# Patient Record
Sex: FEMALE | Race: WHITE | Employment: FULL TIME | URBAN - METROPOLITAN AREA
[De-identification: names, ages, dates, MRNs, and addresses within clinical notes are randomized per-mention and may not be internally consistent; named-entity substitution may affect disease eponyms.]

---

## 2018-09-19 ENCOUNTER — OFFICE VISIT (OUTPATIENT)
Dept: FAMILY MEDICINE CLINIC | Age: 23
End: 2018-09-19

## 2018-09-19 VITALS
HEIGHT: 69 IN | DIASTOLIC BLOOD PRESSURE: 72 MMHG | SYSTOLIC BLOOD PRESSURE: 110 MMHG | HEART RATE: 86 BPM | BODY MASS INDEX: 27.99 KG/M2 | WEIGHT: 189 LBS | OXYGEN SATURATION: 96 %

## 2018-09-19 DIAGNOSIS — N92.6 IRREGULAR MENSES: ICD-10-CM

## 2018-09-19 DIAGNOSIS — Z11.4 ENCOUNTER FOR SCREENING FOR HIV: ICD-10-CM

## 2018-09-19 DIAGNOSIS — Z72.51 UNPROTECTED SEX: ICD-10-CM

## 2018-09-19 DIAGNOSIS — Z00.00 ENCOUNTER FOR MEDICAL EXAMINATION TO ESTABLISH CARE: Primary | ICD-10-CM

## 2018-09-19 LAB
A/G RATIO: 1.6 (ref 1.1–2.2)
ALBUMIN SERPL-MCNC: 4.6 G/DL (ref 3.4–5)
ALP BLD-CCNC: 59 U/L (ref 40–129)
ALT SERPL-CCNC: 9 U/L (ref 10–40)
ANION GAP SERPL CALCULATED.3IONS-SCNC: 13 MMOL/L (ref 3–16)
AST SERPL-CCNC: 13 U/L (ref 15–37)
BASOPHILS ABSOLUTE: 0.1 K/UL (ref 0–0.2)
BASOPHILS RELATIVE PERCENT: 0.5 %
BILIRUB SERPL-MCNC: 0.4 MG/DL (ref 0–1)
BUN BLDV-MCNC: 10 MG/DL (ref 7–20)
CALCIUM SERPL-MCNC: 9 MG/DL (ref 8.3–10.6)
CHLORIDE BLD-SCNC: 104 MMOL/L (ref 99–110)
CO2: 24 MMOL/L (ref 21–32)
CONTROL: NORMAL
CREAT SERPL-MCNC: 0.7 MG/DL (ref 0.6–1.1)
EOSINOPHILS ABSOLUTE: 0.1 K/UL (ref 0–0.6)
EOSINOPHILS RELATIVE PERCENT: 0.9 %
GFR AFRICAN AMERICAN: >60
GFR NON-AFRICAN AMERICAN: >60
GLOBULIN: 2.8 G/DL
GLUCOSE BLD-MCNC: 78 MG/DL (ref 70–99)
HCG(URINE) PREGNANCY TEST: NEGATIVE
HCT VFR BLD CALC: 41 % (ref 36–48)
HEMOGLOBIN: 13.4 G/DL (ref 12–16)
HEPATITIS B SURFACE ANTIGEN INTERPRETATION: NORMAL
HEPATITIS C ANTIBODY INTERPRETATION: NORMAL
LYMPHOCYTES ABSOLUTE: 2.7 K/UL (ref 1–5.1)
LYMPHOCYTES RELATIVE PERCENT: 24.4 %
MCH RBC QN AUTO: 28.1 PG (ref 26–34)
MCHC RBC AUTO-ENTMCNC: 32.8 G/DL (ref 31–36)
MCV RBC AUTO: 85.7 FL (ref 80–100)
MONOCYTES ABSOLUTE: 0.7 K/UL (ref 0–1.3)
MONOCYTES RELATIVE PERCENT: 6 %
NEUTROPHILS ABSOLUTE: 7.7 K/UL (ref 1.7–7.7)
NEUTROPHILS RELATIVE PERCENT: 68.2 %
PDW BLD-RTO: 14.2 % (ref 12.4–15.4)
PLATELET # BLD: 369 K/UL (ref 135–450)
PMV BLD AUTO: 7 FL (ref 5–10.5)
POTASSIUM SERPL-SCNC: 4.5 MMOL/L (ref 3.5–5.1)
PREGNANCY TEST URINE, POC: NORMAL
RBC # BLD: 4.78 M/UL (ref 4–5.2)
SODIUM BLD-SCNC: 141 MMOL/L (ref 136–145)
TOTAL PROTEIN: 7.4 G/DL (ref 6.4–8.2)
WBC # BLD: 11.2 K/UL (ref 4–11)

## 2018-09-19 PROCEDURE — 36415 COLL VENOUS BLD VENIPUNCTURE: CPT | Performed by: PHYSICIAN ASSISTANT

## 2018-09-19 PROCEDURE — 99385 PREV VISIT NEW AGE 18-39: CPT | Performed by: PHYSICIAN ASSISTANT

## 2018-09-19 PROCEDURE — 81025 URINE PREGNANCY TEST: CPT | Performed by: PHYSICIAN ASSISTANT

## 2018-09-19 ASSESSMENT — ENCOUNTER SYMPTOMS
NAUSEA: 0
ABDOMINAL PAIN: 0
SORE THROAT: 0
SHORTNESS OF BREATH: 0
COUGH: 0
VOMITING: 0
RHINORRHEA: 0
CONSTIPATION: 0
DIARRHEA: 0

## 2018-09-19 ASSESSMENT — PATIENT HEALTH QUESTIONNAIRE - PHQ9
SUM OF ALL RESPONSES TO PHQ QUESTIONS 1-9: 0
SUM OF ALL RESPONSES TO PHQ QUESTIONS 1-9: 0
SUM OF ALL RESPONSES TO PHQ9 QUESTIONS 1 & 2: 0
1. LITTLE INTEREST OR PLEASURE IN DOING THINGS: 0
DEPRESSION UNABLE TO ASSESS: PT REFUSES
2. FEELING DOWN, DEPRESSED OR HOPELESS: 0

## 2018-09-20 LAB
HIV AG/AB: NORMAL
HIV ANTIGEN: NORMAL
HIV-1 ANTIBODY: NORMAL
HIV-2 AB: NORMAL

## 2018-09-24 LAB
C. TRACHOMATIS DNA ,URINE: NEGATIVE
N. GONORRHOEAE DNA, URINE: NEGATIVE

## 2018-11-26 ENCOUNTER — OFFICE VISIT (OUTPATIENT)
Dept: FAMILY MEDICINE CLINIC | Age: 23
End: 2018-11-26
Payer: COMMERCIAL

## 2018-11-26 VITALS
HEART RATE: 90 BPM | DIASTOLIC BLOOD PRESSURE: 70 MMHG | WEIGHT: 185.2 LBS | SYSTOLIC BLOOD PRESSURE: 124 MMHG | HEIGHT: 69 IN | BODY MASS INDEX: 27.43 KG/M2 | OXYGEN SATURATION: 98 %

## 2018-11-26 DIAGNOSIS — D72.829 LEUKOCYTOSIS, UNSPECIFIED TYPE: ICD-10-CM

## 2018-11-26 DIAGNOSIS — Z01.419 ENCOUNTER FOR GYNECOLOGICAL EXAMINATION WITHOUT ABNORMAL FINDING: Primary | ICD-10-CM

## 2018-11-26 DIAGNOSIS — N92.6 IRREGULAR MENSES: ICD-10-CM

## 2018-11-26 LAB
HCT VFR BLD CALC: 42.8 % (ref 36–48)
HEMOGLOBIN: 13.9 G/DL (ref 12–16)
MCH RBC QN AUTO: 27.5 PG (ref 26–34)
MCHC RBC AUTO-ENTMCNC: 32.5 G/DL (ref 31–36)
MCV RBC AUTO: 84.5 FL (ref 80–100)
PDW BLD-RTO: 15.2 % (ref 12.4–15.4)
PLATELET # BLD: 394 K/UL (ref 135–450)
PMV BLD AUTO: 7.2 FL (ref 5–10.5)
RBC # BLD: 5.06 M/UL (ref 4–5.2)
WBC # BLD: 6.5 K/UL (ref 4–11)

## 2018-11-26 PROCEDURE — S0612 ANNUAL GYNECOLOGICAL EXAMINA: HCPCS | Performed by: PHYSICIAN ASSISTANT

## 2018-11-26 ASSESSMENT — ENCOUNTER SYMPTOMS
VOMITING: 0
NAUSEA: 0
ABDOMINAL PAIN: 0
DIARRHEA: 0
RHINORRHEA: 0
SHORTNESS OF BREATH: 0
SORE THROAT: 0
COUGH: 0
CONSTIPATION: 0

## 2018-11-26 NOTE — PROGRESS NOTES
2018    Melania Wright (:  1995) is a 26 y.o. female, here for a preventive medicine evaluation. Patient Active Problem List   Diagnosis    History of intentional self-harm    Irregular menses     History of irregular menses. This is not a new development. Periods last 5 days, normal flow. Sometimes will go one month without, but usually menstruates monthly. Does not perform self breast exams. Date of last menstruation -. Mother with h/o cervical cancer. Review of Systems   Constitutional: Negative for activity change, chills and fever. HENT: Negative for congestion, ear pain, rhinorrhea and sore throat. Eyes: Negative for visual disturbance. Respiratory: Negative for cough and shortness of breath. Cardiovascular: Negative for chest pain and palpitations. Gastrointestinal: Negative for abdominal pain, constipation, diarrhea, nausea and vomiting. Genitourinary: Positive for menstrual problem (irregular menses). Negative for difficulty urinating and dysuria. Musculoskeletal: Negative for arthralgias and myalgias. Skin: Negative for rash. Neurological: Negative for dizziness, weakness and numbness. Psychiatric/Behavioral: Negative for sleep disturbance.        Prior to Visit Medications    Not on File        No Known Allergies    Past Medical History:   Diagnosis Date    Right foot injury        Past Surgical History:   Procedure Laterality Date    MYRINGOTOMY AND TYMPANOSTOMY TUBE PLACEMENT         Social History     Social History    Marital status: Single     Spouse name: N/A    Number of children: 0    Years of education: N/A     Occupational History    [employer]      Social History Main Topics    Smoking status: Never Smoker    Smokeless tobacco: Never Used    Alcohol use Yes      Comment: occasional    Drug use: No    Sexual activity: Yes     Partners: Male     Other Topics Concern    Not on file     Social History Narrative    No

## 2018-11-27 LAB
HPV COMMENT: ABNORMAL
HPV TYPE 16: NOT DETECTED
HPV TYPE 18: NOT DETECTED
HPVOH (OTHER TYPES): DETECTED

## 2018-11-28 ENCOUNTER — TELEPHONE (OUTPATIENT)
Dept: FAMILY MEDICINE CLINIC | Age: 23
End: 2018-11-28

## 2018-11-28 DIAGNOSIS — R87.810 CERVICAL HIGH RISK HPV (HUMAN PAPILLOMAVIRUS) TEST POSITIVE: ICD-10-CM

## 2019-10-07 ENCOUNTER — OFFICE VISIT (OUTPATIENT)
Dept: FAMILY MEDICINE CLINIC | Age: 24
End: 2019-10-07
Payer: COMMERCIAL

## 2019-10-07 VITALS
BODY MASS INDEX: 28.58 KG/M2 | TEMPERATURE: 98.4 F | WEIGHT: 193 LBS | SYSTOLIC BLOOD PRESSURE: 106 MMHG | OXYGEN SATURATION: 99 % | HEIGHT: 69 IN | DIASTOLIC BLOOD PRESSURE: 80 MMHG | HEART RATE: 92 BPM | RESPIRATION RATE: 16 BRPM

## 2019-10-07 DIAGNOSIS — N89.8 VAGINAL DISCHARGE: Primary | ICD-10-CM

## 2019-10-07 DIAGNOSIS — Z32.01 POSITIVE PREGNANCY TEST: ICD-10-CM

## 2019-10-07 DIAGNOSIS — Z20.2 STD EXPOSURE: ICD-10-CM

## 2019-10-07 DIAGNOSIS — Z01.419 WOMEN'S ANNUAL ROUTINE GYNECOLOGICAL EXAMINATION: ICD-10-CM

## 2019-10-07 LAB
CONTROL: NORMAL
PREGNANCY TEST URINE, POC: NORMAL

## 2019-10-07 PROCEDURE — 99214 OFFICE O/P EST MOD 30 MIN: CPT | Performed by: PHYSICIAN ASSISTANT

## 2019-10-07 PROCEDURE — 81025 URINE PREGNANCY TEST: CPT | Performed by: PHYSICIAN ASSISTANT

## 2019-10-07 ASSESSMENT — PATIENT HEALTH QUESTIONNAIRE - PHQ9
2. FEELING DOWN, DEPRESSED OR HOPELESS: 0
SUM OF ALL RESPONSES TO PHQ9 QUESTIONS 1 & 2: 0
SUM OF ALL RESPONSES TO PHQ QUESTIONS 1-9: 0
1. LITTLE INTEREST OR PLEASURE IN DOING THINGS: 0
SUM OF ALL RESPONSES TO PHQ QUESTIONS 1-9: 0

## 2019-10-08 LAB
CANDIDA SPECIES, DNA PROBE: ABNORMAL
GARDNERELLA VAGINALIS, DNA PROBE: ABNORMAL
TRICHOMONAS VAGINALIS DNA: ABNORMAL

## 2019-10-09 ENCOUNTER — TELEPHONE (OUTPATIENT)
Dept: FAMILY MEDICINE CLINIC | Age: 24
End: 2019-10-09

## 2019-10-09 DIAGNOSIS — N76.0 BACTERIAL VAGINOSIS: Primary | ICD-10-CM

## 2019-10-09 DIAGNOSIS — B96.89 BACTERIAL VAGINOSIS: Primary | ICD-10-CM

## 2019-10-09 LAB
C TRACH DNA GENITAL QL NAA+PROBE: NEGATIVE
N. GONORRHOEAE DNA: NEGATIVE

## 2019-10-09 RX ORDER — METRONIDAZOLE 500 MG/1
500 TABLET ORAL 2 TIMES DAILY
Qty: 14 TABLET | Refills: 0 | Status: SHIPPED | OUTPATIENT
Start: 2019-10-09 | End: 2019-10-16

## 2019-10-28 ENCOUNTER — OFFICE VISIT (OUTPATIENT)
Dept: OBGYN CLINIC | Age: 24
End: 2019-10-28
Payer: COMMERCIAL

## 2019-10-28 VITALS
DIASTOLIC BLOOD PRESSURE: 70 MMHG | SYSTOLIC BLOOD PRESSURE: 106 MMHG | BODY MASS INDEX: 29.6 KG/M2 | TEMPERATURE: 98.7 F | WEIGHT: 188.6 LBS | HEIGHT: 67 IN | HEART RATE: 90 BPM

## 2019-10-28 DIAGNOSIS — N91.2 AMENORRHEA: Primary | ICD-10-CM

## 2019-10-28 DIAGNOSIS — O21.9 NAUSEA AND VOMITING IN PREGNANCY: ICD-10-CM

## 2019-10-28 DIAGNOSIS — R87.810 CERVICAL HIGH RISK HPV (HUMAN PAPILLOMAVIRUS) TEST POSITIVE: ICD-10-CM

## 2019-10-28 LAB
BACTERIA: ABNORMAL /HPF
BILIRUBIN URINE: NEGATIVE
BLOOD, URINE: NEGATIVE
CLARITY: ABNORMAL
COLOR: YELLOW
CONTROL: NORMAL
CRL: NORMAL CM
EPITHELIAL CELLS, UA: 4 /HPF (ref 0–5)
GLUCOSE URINE: NEGATIVE MG/DL
HYALINE CASTS: 3 /LPF (ref 0–8)
KETONES, URINE: NEGATIVE MG/DL
LEUKOCYTE ESTERASE, URINE: NEGATIVE
MICROSCOPIC EXAMINATION: YES
NITRITE, URINE: NEGATIVE
PH UA: 6.5 (ref 5–8)
PREGNANCY TEST URINE, POC: POSITIVE
PROTEIN UA: NEGATIVE MG/DL
RBC UA: 1 /HPF (ref 0–4)
SAC DIAMETER: NORMAL CM
SPECIFIC GRAVITY UA: 1.01 (ref 1–1.03)
URINE TYPE: ABNORMAL
UROBILINOGEN, URINE: 1 E.U./DL
WBC UA: 2 /HPF (ref 0–5)

## 2019-10-28 PROCEDURE — 99203 OFFICE O/P NEW LOW 30 MIN: CPT | Performed by: OBSTETRICS & GYNECOLOGY

## 2019-10-28 PROCEDURE — 81025 URINE PREGNANCY TEST: CPT | Performed by: OBSTETRICS & GYNECOLOGY

## 2019-10-28 PROCEDURE — 76801 OB US < 14 WKS SINGLE FETUS: CPT | Performed by: OBSTETRICS & GYNECOLOGY

## 2019-10-28 RX ORDER — PYRIDOXINE HCL (VITAMIN B6) 25 MG
25 TABLET ORAL NIGHTLY
Qty: 30 TABLET | Refills: 1 | Status: SHIPPED | OUTPATIENT
Start: 2019-10-28 | End: 2020-02-17

## 2019-10-28 ASSESSMENT — ENCOUNTER SYMPTOMS
ABDOMINAL PAIN: 0
VOMITING: 0
BACK PAIN: 0
DIARRHEA: 0
NAUSEA: 0
SHORTNESS OF BREATH: 0
CONSTIPATION: 0

## 2019-10-30 LAB — URINE CULTURE, ROUTINE: NORMAL

## 2019-11-25 ENCOUNTER — INITIAL PRENATAL (OUTPATIENT)
Dept: OBGYN CLINIC | Age: 24
End: 2019-11-25
Payer: COMMERCIAL

## 2019-11-25 VITALS
WEIGHT: 187.6 LBS | HEART RATE: 79 BPM | BODY MASS INDEX: 29.38 KG/M2 | DIASTOLIC BLOOD PRESSURE: 58 MMHG | SYSTOLIC BLOOD PRESSURE: 86 MMHG

## 2019-11-25 DIAGNOSIS — R87.810 CERVICAL HIGH RISK HPV (HUMAN PAPILLOMAVIRUS) TEST POSITIVE: ICD-10-CM

## 2019-11-25 DIAGNOSIS — Z34.92 PRENATAL CARE IN SECOND TRIMESTER: Primary | ICD-10-CM

## 2019-11-25 LAB
ABO/RH: NORMAL
ANTIBODY SCREEN: NORMAL
BASOPHILS ABSOLUTE: 0 K/UL (ref 0–0.2)
BASOPHILS RELATIVE PERCENT: 0.2 %
EOSINOPHILS ABSOLUTE: 0.1 K/UL (ref 0–0.6)
EOSINOPHILS RELATIVE PERCENT: 0.7 %
HCT VFR BLD CALC: 40.2 % (ref 36–48)
HEMOGLOBIN: 13.5 G/DL (ref 12–16)
LYMPHOCYTES ABSOLUTE: 1.9 K/UL (ref 1–5.1)
LYMPHOCYTES RELATIVE PERCENT: 20.5 %
MCH RBC QN AUTO: 28.2 PG (ref 26–34)
MCHC RBC AUTO-ENTMCNC: 33.7 G/DL (ref 31–36)
MCV RBC AUTO: 83.7 FL (ref 80–100)
MONOCYTES ABSOLUTE: 0.5 K/UL (ref 0–1.3)
MONOCYTES RELATIVE PERCENT: 5.4 %
NEUTROPHILS ABSOLUTE: 6.7 K/UL (ref 1.7–7.7)
NEUTROPHILS RELATIVE PERCENT: 73.2 %
PDW BLD-RTO: 15.4 % (ref 12.4–15.4)
PLATELET # BLD: 323 K/UL (ref 135–450)
PMV BLD AUTO: 7.4 FL (ref 5–10.5)
RBC # BLD: 4.8 M/UL (ref 4–5.2)
WBC # BLD: 9.1 K/UL (ref 4–11)

## 2019-11-25 PROCEDURE — 90471 IMMUNIZATION ADMIN: CPT | Performed by: OBSTETRICS & GYNECOLOGY

## 2019-11-25 PROCEDURE — 36415 COLL VENOUS BLD VENIPUNCTURE: CPT | Performed by: OBSTETRICS & GYNECOLOGY

## 2019-11-25 PROCEDURE — 0500F INITIAL PRENATAL CARE VISIT: CPT | Performed by: OBSTETRICS & GYNECOLOGY

## 2019-11-25 PROCEDURE — 90686 IIV4 VACC NO PRSV 0.5 ML IM: CPT | Performed by: OBSTETRICS & GYNECOLOGY

## 2019-11-26 LAB
AMPHETAMINE SCREEN, URINE: NORMAL
BARBITURATE SCREEN URINE: NORMAL
BENZODIAZEPINE SCREEN, URINE: NORMAL
CANNABINOID SCREEN URINE: NORMAL
COCAINE METABOLITE SCREEN URINE: NORMAL
HEPATITIS B SURFACE ANTIGEN INTERPRETATION: NORMAL
HIV AG/AB: NORMAL
HIV ANTIGEN: NORMAL
HIV-1 ANTIBODY: NORMAL
HIV-2 AB: NORMAL
Lab: NORMAL
METHADONE SCREEN, URINE: NORMAL
OPIATE SCREEN URINE: NORMAL
OXYCODONE URINE: NORMAL
PH UA: 5
PHENCYCLIDINE SCREEN URINE: NORMAL
PROPOXYPHENE SCREEN: NORMAL
RUBELLA ANTIBODY IGG: 332.6 IU/ML
TOTAL SYPHILLIS IGG/IGM: NORMAL

## 2019-12-23 ENCOUNTER — OFFICE VISIT (OUTPATIENT)
Dept: OBGYN CLINIC | Age: 24
End: 2019-12-23
Payer: COMMERCIAL

## 2019-12-23 ENCOUNTER — ROUTINE PRENATAL (OUTPATIENT)
Dept: OBGYN CLINIC | Age: 24
End: 2019-12-23

## 2019-12-23 VITALS
WEIGHT: 186.6 LBS | HEART RATE: 93 BPM | BODY MASS INDEX: 29.23 KG/M2 | SYSTOLIC BLOOD PRESSURE: 104 MMHG | DIASTOLIC BLOOD PRESSURE: 64 MMHG

## 2019-12-23 DIAGNOSIS — R87.810 CERVICAL HIGH RISK HPV (HUMAN PAPILLOMAVIRUS) TEST POSITIVE: ICD-10-CM

## 2019-12-23 DIAGNOSIS — Z34.92 PRENATAL CARE IN SECOND TRIMESTER: Primary | ICD-10-CM

## 2019-12-23 DIAGNOSIS — O21.9 NAUSEA AND VOMITING IN PREGNANCY: ICD-10-CM

## 2019-12-23 LAB
ABDOMINAL CIRCUMFERENCE: NORMAL
BIPARIETAL DIAMETER: NORMAL
ESTIMATED FETAL WEIGHT: NORMAL
FEMORAL DIAMETER: NORMAL
HC/AC: NORMAL
HEAD CIRCUMFERENCE: NORMAL

## 2019-12-23 PROCEDURE — 0502F SUBSEQUENT PRENATAL CARE: CPT | Performed by: OBSTETRICS & GYNECOLOGY

## 2019-12-23 PROCEDURE — 76805 OB US >/= 14 WKS SNGL FETUS: CPT | Performed by: OBSTETRICS & GYNECOLOGY

## 2020-01-23 ENCOUNTER — OFFICE VISIT (OUTPATIENT)
Dept: OBGYN CLINIC | Age: 25
End: 2020-01-23
Payer: COMMERCIAL

## 2020-01-23 ENCOUNTER — ROUTINE PRENATAL (OUTPATIENT)
Dept: OBGYN CLINIC | Age: 25
End: 2020-01-23

## 2020-01-23 VITALS
WEIGHT: 190 LBS | DIASTOLIC BLOOD PRESSURE: 60 MMHG | HEART RATE: 90 BPM | BODY MASS INDEX: 29.76 KG/M2 | SYSTOLIC BLOOD PRESSURE: 106 MMHG

## 2020-01-23 PROCEDURE — 76816 OB US FOLLOW-UP PER FETUS: CPT | Performed by: OBSTETRICS & GYNECOLOGY

## 2020-01-23 PROCEDURE — 0502F SUBSEQUENT PRENATAL CARE: CPT | Performed by: OBSTETRICS & GYNECOLOGY

## 2020-01-23 NOTE — PROGRESS NOTES
Return OB Office Visit    CC:   Chief Complaint   Patient presents with    Routine Prenatal Visit       HPI:  Patient seen and examined. Patient is doing well today. Patient reports some right sided pelvic pain that radiates to her groin. States it is worse with movement and when she has been on her feet for long periods of time. Denies VB, LOF, ctx. +FM. Denies headaches, vision changes, RUQ pain, increased LE edema. Denies chest pain, shortness of breath, fever, chills, nausea, vomiting. Objective:  /60   Pulse 90   Wt 190 lb (86.2 kg)   LMP 08/06/2019 (Exact Date)   BMI 29.76 kg/m²     Physical Exam  Vitals signs reviewed. Constitutional:       General: She is not in acute distress. Appearance: She is well-developed. HENT:      Head: Normocephalic and atraumatic. Eyes:      Conjunctiva/sclera: Conjunctivae normal.   Cardiovascular:      Rate and Rhythm: Normal rate. Pulmonary:      Effort: Pulmonary effort is normal. No respiratory distress. Abdominal:      General: There is no distension. Palpations: Abdomen is soft. Tenderness: There is no tenderness. There is no guarding or rebound. Musculoskeletal:         General: No swelling. Skin:     General: Skin is warm and dry. Neurological:      Mental Status: She is alert and oriented to person, place, and time. Psychiatric:         Behavior: Behavior normal.         Thought Content: Thought content normal.       Ultrasound:   OBSTETRICAL ULTRASOUND GROWTH    DATE: 1/23/20    PHYSICIAN: MARCIAL Call.     SONOGRAPHER: Jakub Guevara [facility]    INDICATION: Growth, follow up     COMPARISON: 12/23/19    TYPE OF SCAN: abdominal    FINDINGS:  A single viable intrauterine pregnancy is noted in cephalic presentation. Cardiac and somatic activity are noted.     The following values were obtained:   Fetal heart rate    156bpm     BPD      5.78cm  23 weeks 5 day(s)   Head Circumference    21.77cm 23 weeks 6 day(s)    Abdominal Circumference   18.52cm 23 weeks 2 day(s)   Femur Length     4.13cm  23 weeks 3 day(s)   Humerus Length    4.12cm  25 weeks 0 day(s)   Amniotic fluid index    12.31cm     EFW      593g  30.0 percentile    Amniotic fluid volume is normal. Based on sonographic criteria the estimated fetal age is 23weeks and 6days with EDC of 5/15/20. There is a 3 day discordance with the established EDC of 20. The patient has an anterior placenta that is adequate distance in relation to the internal cervical os. The evaluation of the lower uterine segment and cervix reveals normal appearing anatomy. The uterus is unremarkable/gravid. Maternal ovaries and adnexae are not well visualized due to the size of the uterus and patient's gravid state. Anatomy seen includes: RVOT, LVOT, ductal arch, aortic arch, stomach, kidneys, bladder, diaphragm    IMPRESSION:  Single live IUP in the third trimester. Adequate interval fetal growth. Limited 4 chamber heart and cerebellum. Assessment/Plan:   Ale Murray is a 27 y.o.  at 24w2d who presents for routine OB visit    1. Prenatal care in second trimester     - FWB: reassuring by ultrasound today     - Maternal wellness questionnaire reviewed - no concerns today, score 1     - Genetic screening: patient declined     - Carrier screening: patient declined     - Anatomy scan: 19 Single living Female IUP. No gross structural abnormalities are visualized. Ant placenta / no previa. CL 44 mm. Amniotic fluid volume is subjectively normal. Limited views of Diaphragm, 4 chamber, LVOT, RVOT, Ductic Arch, Aortic Arch.       - Repeat Anatomy: Limited 4 chamber view and cerebellum on repeat US at 24 weeks - M referral placed     - Flu shot: given 19     - Tdap: plan at 28wks     - PNL: A+/ab-, RI, HepB neg, HIVnr, RPR, Hgb 13.5, UDS negative, GCCT neg, UCx no growth     - Labor, decreased FM, VB, LOF precautions provided     - Follow-up in 4 weeks for LAURA visit, glucose screen and Tdap    Wallene Check, DO

## 2020-02-17 ENCOUNTER — ROUTINE PRENATAL (OUTPATIENT)
Dept: OBGYN CLINIC | Age: 25
End: 2020-02-17
Payer: COMMERCIAL

## 2020-02-17 VITALS
BODY MASS INDEX: 29.98 KG/M2 | HEART RATE: 92 BPM | SYSTOLIC BLOOD PRESSURE: 110 MMHG | WEIGHT: 191.4 LBS | DIASTOLIC BLOOD PRESSURE: 68 MMHG

## 2020-02-17 LAB
BASOPHILS ABSOLUTE: 0 K/UL (ref 0–0.2)
BASOPHILS RELATIVE PERCENT: 0.2 %
EOSINOPHILS ABSOLUTE: 0.1 K/UL (ref 0–0.6)
EOSINOPHILS RELATIVE PERCENT: 0.8 %
GLUCOSE CHALLENGE: 112 MG/DL
HCT VFR BLD CALC: 37.9 % (ref 36–48)
HEMOGLOBIN: 12.4 G/DL (ref 12–16)
LYMPHOCYTES ABSOLUTE: 2.1 K/UL (ref 1–5.1)
LYMPHOCYTES RELATIVE PERCENT: 21.7 %
MCH RBC QN AUTO: 29 PG (ref 26–34)
MCHC RBC AUTO-ENTMCNC: 32.8 G/DL (ref 31–36)
MCV RBC AUTO: 88.5 FL (ref 80–100)
MONOCYTES ABSOLUTE: 0.5 K/UL (ref 0–1.3)
MONOCYTES RELATIVE PERCENT: 4.8 %
NEUTROPHILS ABSOLUTE: 7 K/UL (ref 1.7–7.7)
NEUTROPHILS RELATIVE PERCENT: 72.5 %
PDW BLD-RTO: 14.9 % (ref 12.4–15.4)
PLATELET # BLD: 340 K/UL (ref 135–450)
PMV BLD AUTO: 7.7 FL (ref 5–10.5)
RBC # BLD: 4.28 M/UL (ref 4–5.2)
WBC # BLD: 9.7 K/UL (ref 4–11)

## 2020-02-17 PROCEDURE — 0502F SUBSEQUENT PRENATAL CARE: CPT | Performed by: OBSTETRICS & GYNECOLOGY

## 2020-02-17 PROCEDURE — 36415 COLL VENOUS BLD VENIPUNCTURE: CPT | Performed by: OBSTETRICS & GYNECOLOGY

## 2020-02-17 NOTE — PROGRESS NOTES
Blood draw from R Antecubital x 1 attempt without difficulty. 1 PST, 1 LAV tubes drawn. Patient tolerated well.  Ana Lilia Landon
Temp:98.4 f oral    Maternal emotional well being screening form completed and reviewed with patient. Current score is 2. Patient given referral to [address] [place] [address] ([phone]):  No
weeks: Limited 4 chamber view and cerebellum       - MFM Level II US - Normal fetal anatomic survey     - Flu shot: given 19     - Tdap: given 2020     - PNL: A+/ab-, RI, HepB neg, HIVnr, RPR, Hgb 13.5, UDS negative, GCCT neg, UCx no growth     - Labor, decreased FM, VB, LOF precautions provided     - Tdap, glucose screen and CBC today     - Follow-up in 2 weeks for LAURA visit    2.   screening     - Glucose screen and CBC today    Carl Calhoun DO

## 2020-03-02 ENCOUNTER — ROUTINE PRENATAL (OUTPATIENT)
Dept: OBGYN CLINIC | Age: 25
End: 2020-03-02

## 2020-03-02 VITALS
DIASTOLIC BLOOD PRESSURE: 78 MMHG | HEART RATE: 87 BPM | WEIGHT: 189 LBS | SYSTOLIC BLOOD PRESSURE: 110 MMHG | BODY MASS INDEX: 29.6 KG/M2

## 2020-03-02 PROBLEM — O26.13 LOW WEIGHT GAIN IN PREGNANCY IN THIRD TRIMESTER: Status: ACTIVE | Noted: 2020-03-02

## 2020-03-02 PROBLEM — Z34.93 PRENATAL CARE IN THIRD TRIMESTER: Status: ACTIVE | Noted: 2019-11-25

## 2020-03-02 PROCEDURE — 0502F SUBSEQUENT PRENATAL CARE: CPT | Performed by: OBSTETRICS & GYNECOLOGY

## 2020-03-02 NOTE — PROGRESS NOTES
Temp; 98.2   Maternal emotional well being screening form completed and reviewed with patient. Current score is 1. Patient given referral to [address] [place] [address] ([phone]):  No
high risk HPV (human papillomavirus) test positive     - NILM/HPV other positive 10/2019  - plan repeat pap/cotesting in 1 year               Dispo: RTC in 2 weeks  Becky Crabtree MD

## 2020-03-17 ENCOUNTER — ROUTINE PRENATAL (OUTPATIENT)
Dept: OBGYN CLINIC | Age: 25
End: 2020-03-17

## 2020-03-17 ENCOUNTER — OFFICE VISIT (OUTPATIENT)
Dept: OBGYN CLINIC | Age: 25
End: 2020-03-17
Payer: COMMERCIAL

## 2020-03-17 VITALS
DIASTOLIC BLOOD PRESSURE: 64 MMHG | WEIGHT: 193.2 LBS | HEART RATE: 92 BPM | BODY MASS INDEX: 30.26 KG/M2 | SYSTOLIC BLOOD PRESSURE: 102 MMHG

## 2020-03-17 PROCEDURE — 76816 OB US FOLLOW-UP PER FETUS: CPT | Performed by: OBSTETRICS & GYNECOLOGY

## 2020-03-17 PROCEDURE — 0502F SUBSEQUENT PRENATAL CARE: CPT | Performed by: OBSTETRICS & GYNECOLOGY

## 2020-03-17 NOTE — ASSESSMENT & PLAN NOTE
Patient with only 4lb weight gain this pregnancy.   - S<D on 3/17, growth US at that time shows adequate interval fetal growth, EFW 61%ile   - continue to monitor

## 2020-03-17 NOTE — PROGRESS NOTES
Temp: 98.3 f oral    Maternal emotional well being screening form completed and reviewed with patient. Current score is 2. Patient given referral to [address] [place] [address] ([phone]):  No

## 2020-03-17 NOTE — PROGRESS NOTES
Return OB Office Visit    CC:   Chief Complaint   Patient presents with    Routine Prenatal Visit       HPI:  Pt seen and examined. No concerns/complaints. Denies VB, LOF, ctx. +FM    Maternal wellness questionnaire reviewed - no concerns today. Score 2. Objective:  /64   Pulse 92   Wt 193 lb 3.2 oz (87.6 kg)   LMP 08/06/2019 (Exact Date)   BMI 30.26 kg/m²   Gen: AO, NAD  Abd: Soft, NT  FHT: 156  FH: 28cm, vertex by Leopolds  Ext: Mild LE edema    Ultrasound:  Impression   OBSTETRICAL ULTRASOUND GROWTH       DATE: 03/17/2020       PHYSICIAN: Ramses Tse M.D.        SONOGRAPHER: GERTRUDE Schroeder [facility]       INDICATION: Growth       COMPARISON: 01/23/2020       TYPE OF SCAN: abdominal       FINDINGS:   A single viable intrauterine pregnancy is noted in cephalic presentation. Cardiac and somatic activity are noted.       The following values were obtained:   Fetal heart rate 160bpm   BPD 8.25cm 33 weeks 1 day(s)   Head Circumference 30.96cm 34 weeks 4 day(s)    Abdominal Circumference 27.51cm 31 weeks 4 day(s)   Femur Length 6.58cm 33 weeks 6 day(s)   Humerus Length 5.67cm 33 weeks 0 day(s)   Amniotic fluid index 10.43cm   EFW 2036g 62.5 percentile       Amniotic fluid volume is normal. Based on sonographic criteria the estimated fetal age is 33weeks and 2days with EDC of 05/03/2020. There is a 9 day discordance with the established EDC of 05/12/2020.        The patient has a anterior placenta that is adequate distance in relation to the internal cervical os. The evaluation of the lower uterine segment and cervix reveals normal appearing anatomy.  The uterus is unremarkable/gravid.  Maternal ovaries and adnexae are not well visualized due to the size of the uterus and patient's gravid state.       Anatomy seen includes: heart, stomach, kidneys, bladder       IMPRESSION:   Single live IUP in the third trimester. Adequate interval fetal growth.        Imaging is limited secondary to fetal position.    The patient

## 2020-03-31 ENCOUNTER — ROUTINE PRENATAL (OUTPATIENT)
Dept: OBGYN CLINIC | Age: 25
End: 2020-03-31

## 2020-03-31 VITALS
DIASTOLIC BLOOD PRESSURE: 72 MMHG | HEART RATE: 101 BPM | TEMPERATURE: 97.7 F | WEIGHT: 191.2 LBS | BODY MASS INDEX: 29.95 KG/M2 | SYSTOLIC BLOOD PRESSURE: 104 MMHG

## 2020-03-31 PROCEDURE — 0502F SUBSEQUENT PRENATAL CARE: CPT | Performed by: OBSTETRICS & GYNECOLOGY

## 2020-03-31 NOTE — ASSESSMENT & PLAN NOTE
Patient with only 1lb weight gain this pregnancy.   - S<D on 3/17, growth US at that time shows adequate interval fetal growth, EFW 61%ile   - continue to monitor, repeat growth US at next visit

## 2020-04-14 ENCOUNTER — TELEPHONE (OUTPATIENT)
Dept: OBGYN CLINIC | Age: 25
End: 2020-04-14

## 2020-04-15 ENCOUNTER — ROUTINE PRENATAL (OUTPATIENT)
Dept: OBGYN CLINIC | Age: 25
End: 2020-04-15

## 2020-04-15 ENCOUNTER — OFFICE VISIT (OUTPATIENT)
Dept: OBGYN CLINIC | Age: 25
End: 2020-04-15
Payer: COMMERCIAL

## 2020-04-15 VITALS
SYSTOLIC BLOOD PRESSURE: 90 MMHG | BODY MASS INDEX: 30.2 KG/M2 | DIASTOLIC BLOOD PRESSURE: 64 MMHG | HEART RATE: 94 BPM | WEIGHT: 192.8 LBS

## 2020-04-15 PROCEDURE — 0502F SUBSEQUENT PRENATAL CARE: CPT | Performed by: OBSTETRICS & GYNECOLOGY

## 2020-04-15 PROCEDURE — 76816 OB US FOLLOW-UP PER FETUS: CPT | Performed by: OBSTETRICS & GYNECOLOGY

## 2020-04-15 NOTE — PROGRESS NOTES
Oral temp: 98.1  Maternal emotional well being screening form completed and reviewed with patient. Current score is 0. Patient given referral to [address] [place] [address] ([phone]):  No

## 2020-04-18 LAB — GROUP B STREP CULTURE: NORMAL

## 2020-04-22 ENCOUNTER — ROUTINE PRENATAL (OUTPATIENT)
Dept: OBGYN CLINIC | Age: 25
End: 2020-04-22

## 2020-04-22 VITALS
BODY MASS INDEX: 30.6 KG/M2 | WEIGHT: 195.4 LBS | DIASTOLIC BLOOD PRESSURE: 68 MMHG | HEART RATE: 108 BPM | SYSTOLIC BLOOD PRESSURE: 108 MMHG

## 2020-04-22 PROCEDURE — 0502F SUBSEQUENT PRENATAL CARE: CPT | Performed by: OBSTETRICS & GYNECOLOGY

## 2020-04-22 NOTE — PROGRESS NOTES
Temp: 98.2 f oral    Maternal emotional well being screening form completed and reviewed with patient. Current score is 0. Patient given referral to [address] [place] [address] ([phone]):  No

## 2020-04-29 ENCOUNTER — ROUTINE PRENATAL (OUTPATIENT)
Dept: OBGYN CLINIC | Age: 25
End: 2020-04-29

## 2020-04-29 VITALS
HEART RATE: 111 BPM | WEIGHT: 197 LBS | BODY MASS INDEX: 30.85 KG/M2 | TEMPERATURE: 98 F | DIASTOLIC BLOOD PRESSURE: 72 MMHG | SYSTOLIC BLOOD PRESSURE: 122 MMHG

## 2020-04-29 PROCEDURE — 0502F SUBSEQUENT PRENATAL CARE: CPT | Performed by: OBSTETRICS & GYNECOLOGY

## 2020-05-04 ENCOUNTER — TELEPHONE (OUTPATIENT)
Dept: OBGYN CLINIC | Age: 25
End: 2020-05-04

## 2020-05-05 ENCOUNTER — ROUTINE PRENATAL (OUTPATIENT)
Dept: OBGYN CLINIC | Age: 25
End: 2020-05-05

## 2020-05-05 ENCOUNTER — TELEPHONE (OUTPATIENT)
Dept: OBGYN CLINIC | Age: 25
End: 2020-05-05

## 2020-05-05 VITALS
HEART RATE: 92 BPM | SYSTOLIC BLOOD PRESSURE: 90 MMHG | DIASTOLIC BLOOD PRESSURE: 64 MMHG | BODY MASS INDEX: 30.67 KG/M2 | WEIGHT: 195.8 LBS

## 2020-05-05 PROCEDURE — 0502F SUBSEQUENT PRENATAL CARE: CPT | Performed by: OBSTETRICS & GYNECOLOGY

## 2020-05-05 NOTE — PROGRESS NOTES
Oral temp: 97.6    Maternal emotional well being screening form completed and reviewed with patient. Current score is 0.    Patient given referral to [address] [place] [address] ([phone]): No    Patient is scheduled for induction on 5/13/20 at 5pm.

## 2020-05-05 NOTE — PROGRESS NOTES
Return OB Office Visit    CC:   Chief Complaint   Patient presents with    Routine Prenatal Visit       HPI:  Pt seen and examined. No concerns/complaints. Denies VB, LOF. +FM. Does feel like over the last few days she has had increased cramps over the last day and a half. Maternal wellness questionnaire reviewed - no concerns today. Score 0. Objective:  BP 90/64   Pulse 92   Wt 195 lb 12.8 oz (88.8 kg)   LMP 2019 (Exact Date)   BMI 30.67 kg/m²   Gen: AO, NAD  Abd: Soft, NT  FHT: 165  FH: 35cm, vertex by Leopolds  Ext: Mild LE edema  SVE: 1-/-2    Assessment/Plan:  27 y.o.  at 39w0d (Estimated Date of Delivery: 20) presents for LAURA appointment:     Problem List Items Addressed This Visit        Gynecology/Obstetric Problems    Prenatal care in third trimester - Primary     - FWB: reassuring by amy today  - Genetic screening: patient declined  - Carrier screening: patient declined  - Anatomy scan: 19 Single living Female IUP. No gross structural abnormalities are visualized. Ant placenta / no previa. CL 44 mm. Amniotic fluid volume is subjectively normal. Limited views of Diaphragm, 4 chamber, LVOT, RVOT, Ductic Arch, Aortic Arch. - Repeat Anatomy: Limited 4 chamber view and cerebellum on repeat US at 24 weeks - MFM referral placed and normal anatomy at that time  - Flu shot: given 19  - Tdap: given 20  - PNL: A+/ab-, RI, HepB neg, HIVnr, RPR, Hgb 13.5, UDS negative, GCCT neg, UCx no growth    -- 1hr , Hgb 12.4, plt 340    -- GBS negative    -- IOL scheduled 2020 at 1700         Low weight gain in pregnancy in third trimester     Patient with only 2lb weight gain this pregnancy.    - Growth US at 36wks appropriate            Other    History of intentional self-harm          Dispo: IOL scheduled for 2020 at 1700, labor precautions reviewed today  Ese Mckenna MD

## 2020-05-08 ENCOUNTER — ANESTHESIA EVENT (OUTPATIENT)
Dept: LABOR AND DELIVERY | Age: 25
End: 2020-05-08
Payer: COMMERCIAL

## 2020-05-08 ENCOUNTER — HOSPITAL ENCOUNTER (INPATIENT)
Age: 25
LOS: 2 days | Discharge: HOME | End: 2020-05-10
Attending: OBSTETRICS & GYNECOLOGY | Admitting: OBSTETRICS & GYNECOLOGY
Payer: COMMERCIAL

## 2020-05-08 ENCOUNTER — ANESTHESIA (OUTPATIENT)
Dept: LABOR AND DELIVERY | Age: 25
End: 2020-05-08
Payer: COMMERCIAL

## 2020-05-08 PROBLEM — Z37.9 NORMAL LABOR: Status: ACTIVE | Noted: 2020-05-08

## 2020-05-08 LAB
ABO/RH: NORMAL
AMPHETAMINE SCREEN, URINE: NORMAL
ANTIBODY SCREEN: NORMAL
BARBITURATE SCREEN URINE: NORMAL
BASOPHILS ABSOLUTE: 0 K/UL (ref 0–0.2)
BASOPHILS ABSOLUTE: 0.1 K/UL (ref 0–0.2)
BASOPHILS RELATIVE PERCENT: 0.2 %
BASOPHILS RELATIVE PERCENT: 0.5 %
BENZODIAZEPINE SCREEN, URINE: NORMAL
BUPRENORPHINE URINE: NORMAL
CANNABINOID SCREEN URINE: NORMAL
COCAINE METABOLITE SCREEN URINE: NORMAL
EOSINOPHILS ABSOLUTE: 0 K/UL (ref 0–0.6)
EOSINOPHILS ABSOLUTE: 0 K/UL (ref 0–0.6)
EOSINOPHILS RELATIVE PERCENT: 0 %
EOSINOPHILS RELATIVE PERCENT: 0.3 %
HCT VFR BLD CALC: 32.2 % (ref 36–48)
HCT VFR BLD CALC: 37.3 % (ref 36–48)
HEMOGLOBIN: 10.3 G/DL (ref 12–16)
HEMOGLOBIN: 12.4 G/DL (ref 12–16)
LYMPHOCYTES ABSOLUTE: 2 K/UL (ref 1–5.1)
LYMPHOCYTES ABSOLUTE: 3.1 K/UL (ref 1–5.1)
LYMPHOCYTES RELATIVE PERCENT: 13.6 %
LYMPHOCYTES RELATIVE PERCENT: 23.8 %
Lab: NORMAL
MCH RBC QN AUTO: 26.1 PG (ref 26–34)
MCH RBC QN AUTO: 26.8 PG (ref 26–34)
MCHC RBC AUTO-ENTMCNC: 32.1 G/DL (ref 31–36)
MCHC RBC AUTO-ENTMCNC: 33.2 G/DL (ref 31–36)
MCV RBC AUTO: 80.7 FL (ref 80–100)
MCV RBC AUTO: 81.2 FL (ref 80–100)
METHADONE SCREEN, URINE: NORMAL
MONOCYTES ABSOLUTE: 0.7 K/UL (ref 0–1.3)
MONOCYTES ABSOLUTE: 0.8 K/UL (ref 0–1.3)
MONOCYTES RELATIVE PERCENT: 4.5 %
MONOCYTES RELATIVE PERCENT: 6.4 %
NEUTROPHILS ABSOLUTE: 12 K/UL (ref 1.7–7.7)
NEUTROPHILS ABSOLUTE: 9 K/UL (ref 1.7–7.7)
NEUTROPHILS RELATIVE PERCENT: 69 %
NEUTROPHILS RELATIVE PERCENT: 81.7 %
OPIATE SCREEN URINE: NORMAL
OXYCODONE URINE: NORMAL
PDW BLD-RTO: 14.3 % (ref 12.4–15.4)
PDW BLD-RTO: 14.5 % (ref 12.4–15.4)
PH UA: 7
PHENCYCLIDINE SCREEN URINE: NORMAL
PLATELET # BLD: 250 K/UL (ref 135–450)
PLATELET # BLD: 303 K/UL (ref 135–450)
PMV BLD AUTO: 8.5 FL (ref 5–10.5)
PMV BLD AUTO: 8.8 FL (ref 5–10.5)
PROPOXYPHENE SCREEN: NORMAL
RBC # BLD: 3.96 M/UL (ref 4–5.2)
RBC # BLD: 4.62 M/UL (ref 4–5.2)
TOTAL SYPHILLIS IGG/IGM: NORMAL
WBC # BLD: 13.1 K/UL (ref 4–11)
WBC # BLD: 14.7 K/UL (ref 4–11)

## 2020-05-08 PROCEDURE — 86780 TREPONEMA PALLIDUM: CPT

## 2020-05-08 PROCEDURE — 2500000003 HC RX 250 WO HCPCS: Performed by: NURSE ANESTHETIST, CERTIFIED REGISTERED

## 2020-05-08 PROCEDURE — 59400 OBSTETRICAL CARE: CPT | Performed by: OBSTETRICS & GYNECOLOGY

## 2020-05-08 PROCEDURE — 36415 COLL VENOUS BLD VENIPUNCTURE: CPT

## 2020-05-08 PROCEDURE — 6370000000 HC RX 637 (ALT 250 FOR IP): Performed by: OBSTETRICS & GYNECOLOGY

## 2020-05-08 PROCEDURE — 86901 BLOOD TYPING SEROLOGIC RH(D): CPT

## 2020-05-08 PROCEDURE — 3700000025 EPIDURAL BLOCK: Performed by: ANESTHESIOLOGY

## 2020-05-08 PROCEDURE — 1220000000 HC SEMI PRIVATE OB R&B

## 2020-05-08 PROCEDURE — 7200000001 HC VAGINAL DELIVERY

## 2020-05-08 PROCEDURE — 2580000003 HC RX 258: Performed by: OBSTETRICS & GYNECOLOGY

## 2020-05-08 PROCEDURE — 6360000002 HC RX W HCPCS

## 2020-05-08 PROCEDURE — 0UQMXZZ REPAIR VULVA, EXTERNAL APPROACH: ICD-10-PCS | Performed by: OBSTETRICS & GYNECOLOGY

## 2020-05-08 PROCEDURE — 85025 COMPLETE CBC W/AUTO DIFF WBC: CPT

## 2020-05-08 PROCEDURE — 51701 INSERT BLADDER CATHETER: CPT

## 2020-05-08 PROCEDURE — 86850 RBC ANTIBODY SCREEN: CPT

## 2020-05-08 PROCEDURE — 86900 BLOOD TYPING SEROLOGIC ABO: CPT

## 2020-05-08 PROCEDURE — 80307 DRUG TEST PRSMV CHEM ANLYZR: CPT

## 2020-05-08 RX ORDER — DOCUSATE SODIUM 100 MG/1
100 CAPSULE, LIQUID FILLED ORAL 2 TIMES DAILY
Status: DISCONTINUED | OUTPATIENT
Start: 2020-05-08 | End: 2020-05-08

## 2020-05-08 RX ORDER — DIPHENHYDRAMINE HYDROCHLORIDE 50 MG/ML
25 INJECTION INTRAMUSCULAR; INTRAVENOUS EVERY 4 HOURS PRN
Status: DISCONTINUED | OUTPATIENT
Start: 2020-05-08 | End: 2020-05-08

## 2020-05-08 RX ORDER — LANOLIN 100 %
OINTMENT (GRAM) TOPICAL PRN
Status: DISCONTINUED | OUTPATIENT
Start: 2020-05-08 | End: 2020-05-10 | Stop reason: HOSPADM

## 2020-05-08 RX ORDER — FERROUS SULFATE 325(65) MG
325 TABLET ORAL
Status: DISCONTINUED | OUTPATIENT
Start: 2020-05-08 | End: 2020-05-10 | Stop reason: HOSPADM

## 2020-05-08 RX ORDER — SIMETHICONE 80 MG
80 TABLET,CHEWABLE ORAL EVERY 6 HOURS PRN
Status: DISCONTINUED | OUTPATIENT
Start: 2020-05-08 | End: 2020-05-10 | Stop reason: HOSPADM

## 2020-05-08 RX ORDER — SODIUM CHLORIDE, SODIUM LACTATE, POTASSIUM CHLORIDE, CALCIUM CHLORIDE 600; 310; 30; 20 MG/100ML; MG/100ML; MG/100ML; MG/100ML
INJECTION, SOLUTION INTRAVENOUS CONTINUOUS
Status: DISCONTINUED | OUTPATIENT
Start: 2020-05-08 | End: 2020-05-08

## 2020-05-08 RX ORDER — FAMOTIDINE 20 MG/1
20 TABLET, FILM COATED ORAL 2 TIMES DAILY PRN
Status: DISCONTINUED | OUTPATIENT
Start: 2020-05-08 | End: 2020-05-10 | Stop reason: HOSPADM

## 2020-05-08 RX ORDER — BUPIVACAINE HYDROCHLORIDE 2.5 MG/ML
INJECTION, SOLUTION EPIDURAL; INFILTRATION; INTRACAUDAL PRN
Status: DISCONTINUED | OUTPATIENT
Start: 2020-05-08 | End: 2020-05-08 | Stop reason: SDUPTHER

## 2020-05-08 RX ORDER — SODIUM CHLORIDE 0.9 % (FLUSH) 0.9 %
10 SYRINGE (ML) INJECTION EVERY 12 HOURS SCHEDULED
Status: DISCONTINUED | OUTPATIENT
Start: 2020-05-08 | End: 2020-05-08

## 2020-05-08 RX ORDER — DOCUSATE SODIUM 100 MG/1
100 CAPSULE, LIQUID FILLED ORAL 2 TIMES DAILY
Status: DISCONTINUED | OUTPATIENT
Start: 2020-05-08 | End: 2020-05-10 | Stop reason: HOSPADM

## 2020-05-08 RX ORDER — ACETAMINOPHEN 325 MG/1
650 TABLET ORAL EVERY 4 HOURS PRN
Status: DISCONTINUED | OUTPATIENT
Start: 2020-05-08 | End: 2020-05-10 | Stop reason: HOSPADM

## 2020-05-08 RX ORDER — SODIUM CHLORIDE 0.9 % (FLUSH) 0.9 %
10 SYRINGE (ML) INJECTION PRN
Status: DISCONTINUED | OUTPATIENT
Start: 2020-05-08 | End: 2020-05-08

## 2020-05-08 RX ORDER — IBUPROFEN 800 MG/1
800 TABLET ORAL EVERY 8 HOURS
Status: DISCONTINUED | OUTPATIENT
Start: 2020-05-08 | End: 2020-05-10 | Stop reason: HOSPADM

## 2020-05-08 RX ORDER — ONDANSETRON 2 MG/ML
4 INJECTION INTRAMUSCULAR; INTRAVENOUS EVERY 6 HOURS PRN
Status: DISCONTINUED | OUTPATIENT
Start: 2020-05-08 | End: 2020-05-08

## 2020-05-08 RX ORDER — SODIUM CHLORIDE 0.9 % (FLUSH) 0.9 %
10 SYRINGE (ML) INJECTION PRN
Status: DISCONTINUED | OUTPATIENT
Start: 2020-05-08 | End: 2020-05-10 | Stop reason: HOSPADM

## 2020-05-08 RX ORDER — ACETAMINOPHEN 325 MG/1
650 TABLET ORAL EVERY 4 HOURS PRN
Status: DISCONTINUED | OUTPATIENT
Start: 2020-05-08 | End: 2020-05-08

## 2020-05-08 RX ORDER — SODIUM CHLORIDE 0.9 % (FLUSH) 0.9 %
10 SYRINGE (ML) INJECTION EVERY 12 HOURS SCHEDULED
Status: DISCONTINUED | OUTPATIENT
Start: 2020-05-08 | End: 2020-05-10 | Stop reason: HOSPADM

## 2020-05-08 RX ORDER — SODIUM CHLORIDE, SODIUM LACTATE, POTASSIUM CHLORIDE, CALCIUM CHLORIDE 600; 310; 30; 20 MG/100ML; MG/100ML; MG/100ML; MG/100ML
INJECTION, SOLUTION INTRAVENOUS CONTINUOUS
Status: DISCONTINUED | OUTPATIENT
Start: 2020-05-08 | End: 2020-05-10 | Stop reason: HOSPADM

## 2020-05-08 RX ORDER — ONDANSETRON 2 MG/ML
4 INJECTION INTRAMUSCULAR; INTRAVENOUS EVERY 6 HOURS PRN
Status: DISCONTINUED | OUTPATIENT
Start: 2020-05-08 | End: 2020-05-10 | Stop reason: HOSPADM

## 2020-05-08 RX ADMIN — WITCH HAZEL 40 EACH: 500 SOLUTION RECTAL; TOPICAL at 05:50

## 2020-05-08 RX ADMIN — DOCUSATE SODIUM 100 MG: 100 CAPSULE, LIQUID FILLED ORAL at 10:44

## 2020-05-08 RX ADMIN — BENZOCAINE AND LEVOMENTHOL: 200; 5 SPRAY TOPICAL at 05:50

## 2020-05-08 RX ADMIN — SODIUM CHLORIDE, POTASSIUM CHLORIDE, SODIUM LACTATE AND CALCIUM CHLORIDE: 600; 310; 30; 20 INJECTION, SOLUTION INTRAVENOUS at 01:50

## 2020-05-08 RX ADMIN — IBUPROFEN 800 MG: 800 TABLET ORAL at 10:43

## 2020-05-08 RX ADMIN — BUPIVACAINE HYDROCHLORIDE 7 ML: 2.5 INJECTION, SOLUTION EPIDURAL; INFILTRATION; INTRACAUDAL; PERINEURAL at 02:39

## 2020-05-08 RX ADMIN — Medication 10 ML: at 05:50

## 2020-05-08 RX ADMIN — Medication 15 ML/HR: at 02:43

## 2020-05-08 RX ADMIN — Medication 999 MILLI-UNITS/MIN: at 03:28

## 2020-05-08 RX ADMIN — SODIUM CHLORIDE, POTASSIUM CHLORIDE, SODIUM LACTATE AND CALCIUM CHLORIDE: 600; 310; 30; 20 INJECTION, SOLUTION INTRAVENOUS at 02:30

## 2020-05-08 RX ADMIN — Medication 10 ML: at 10:42

## 2020-05-08 ASSESSMENT — PAIN SCALES - GENERAL: PAINLEVEL_OUTOF10: 10

## 2020-05-08 NOTE — H&P
No  Pap History:  Last PAP was normal; October/2019. Sexually transmitted disease history: Human Papilloma Virus    Past Medical History:        Diagnosis Date    Right foot injury 2018     Past Surgical History:        Procedure Laterality Date    MYRINGOTOMY AND TYMPANOSTOMY TUBE PLACEMENT       Social History:    TOBACCO:   reports that she has never smoked. She has never used smokeless tobacco.  ETOH:   reports previous alcohol use. DRUGS:   reports no history of drug use. Family History:       Problem Relation Age of Onset    Diabetes Maternal Grandmother     Cervical Cancer Maternal Grandmother     Lung Cancer Maternal Grandmother     Breast Cancer Paternal Grandmother     High Blood Pressure Paternal Grandfather     No Known Problems Maternal Grandfather     No Known Problems Father     No Known Problems Mother     No Known Problems Brother     No Known Problems Sister     No Known Problems Brother     No Known Problems Brother     No Known Problems Sister     No Known Problems Brother     No Known Problems Brother     No Known Problems Maternal Aunt     No Known Problems Maternal Aunt     No Known Problems Maternal Aunt      Medications Prior to Admission:  Medications Prior to Admission: Prenatal Vit-Fe Fumarate-FA (PRENATAL VITAMIN PO), Take by mouth  Allergies:  Patient has no known allergies. REVIEW OF SYSTEMS:    Patient has a history of depression .   Patient has no symptoms of depression  CONSTITUTIONAL:  negative for  fevers, chills, sweats and fatigue  RESPIRATORY:  negative for  dry cough, cough with sputum and dyspnea  CARDIOVASCULAR:  negative for  chest pain, dyspnea, palpitations  GASTROINTESTINAL:  negative for nausea, vomiting, diarrhea and constipation  GENITOURINARY:  positive for vaginal discharge, vaginal pain and abdominal pain  negative for dysuria and hematuria  INTEGUMENT/BREAST:  negative  HEMATOLOGIC/LYMPHATIC:  negative  ENDOCRINE:

## 2020-05-08 NOTE — PROGRESS NOTES
Patient up for the first time with the help of 2 RNs. Patient tolerated well. Linens were changed, pericare performed, panties applied and  Pad changed. IV discontinued with angiocath intact. Patient voided 400 ml without difficulty. Patient was transported via wheelchair to room 317. Patient is pink and stable .  Mandie Wade

## 2020-05-08 NOTE — ANESTHESIA PROCEDURE NOTES
Epidural Block    Patient location during procedure: OB  Reason for block: labor epidural  Staffing  Resident/CRNA: Evans Serna APRN - CRNA  Preanesthetic Checklist  Completed: patient identified, pre-op evaluation, timeout performed, IV checked, risks and benefits discussed, monitors and equipment checked, anesthesia consent given, oxygen available and patient being monitored  Epidural  Patient position: sitting  Prep: ChloraPrep and site prepped and draped  Patient monitoring: continuous pulse ox and frequent blood pressure checks  Approach: midline  Location: lumbar (1-5)  Injection technique: MIGUEL ANGEL saline  Provider prep: mask and sterile gloves  Needle  Needle type: Tuohy   Needle gauge: 17 G  Needle length: 3.5 in  Needle insertion depth: 6 cm  Catheter type: side hole  Catheter size: 19 G  Catheter at skin depth: 12 cm  Test dose: negative  Assessment  Hemodynamics: stable  Attempts: 1  Additional Notes  Pt prepped and draped in sterile fashion. Skin wheal with 1% lidocaine. MIGUEL ANGEL with 3 cc NS, 25g spinal needle inserted per mariposa. Clear CSF visualized in hub. Needle withdrawn and catheter threaded. Negative test dose 3cc 1.5% Lidocaine with Epinephrine. Sterile dressing applied.

## 2020-05-08 NOTE — ANESTHESIA PRE PROCEDURE
01:47 PM        CREATININE               0.7                 09/19/2018 01:47 PM        GLUCOSE                  78                  09/19/2018 01:47 PM   COAGS  No results found for: PROTIME, INR, APTT)        Anesthetic plan and risks discussed with patient.                       NOLBERTO Bell - CRNA   5/8/2020

## 2020-05-08 NOTE — LACTATION NOTE
This note was copied from a baby's chart. Lactation Progress Note      Data:   Initial lactation consult with primoneida. MOB states that infant had a good first feeding after delivery early this morning. Infant currently 6 hours old. Also states that she attempted feeding around 0730 with gtts given, no latch achieved. Infant currently sleeping in mobs arms sts. LC to assist and provide bf education to family. Output not established. Action: Introduced self to patient as Lactation RN, name and phone number written on white board in room. Assisted mob with rousing techniques to help wake sleepy infant. Infant with minimal feeding cues present, licking nipple. LC showed mob how to hand express gtts of colostrum for infant and place into mouth. 15 + gtts given to infant at this time. Few minutes later, infant gagging. LC assisted mob with brining infant up to chest to pat on back to help infant to get out fluid. Infant swallowed it back down. Reassurance given to family at this time on normal  feeding patterns in the first 24 hours of life, and also how infant can still have amniotic fluid in belly that needs to come up. Holding infant upright, and burping after feedings will help infant to get it out. Bulb suction at bedside, and educated family on use with demonstration. Reviewed with mother what to expect over the next  24-48 hours with infant feedings, infant output, and breast care. Reviewed infant feeding cues and encouraged mother to allow infant to breast feed on demand, a minimum of 8-12 times a day after the first day of life. Also encouraged mother to avoid giving infant a pacifier or bottle for at least the first two weeks of life. Binder and breast feeding log reviewed, all questions answered. Mother instructed to call Lactation nurse for F/U care as needed. Response: MOB verbalizes understanding of breast feeding education that was provided.  Demonstrated understanding of how to hand

## 2020-05-08 NOTE — LACTATION NOTE
This note was copied from a baby's chart. Lactation Progress Note      Data:   F/U with primip per RN request to assist with breast feeding. MOB states that she has been attempting to wake infant for feeding. Diaper noted to be wet and full of meconium. LC to assist with diaper change and feeding. Action: Infant brought up to crib and changed. Large meconium stool, and urine in diaper. Infant more alert when placed back at breast. LC assisted mob with placing infant in football hold. Infant licking at breast initially. LC provided gentle stimulation to encourage suck burst. After about 5 minutes infant started with nutritive suck burst. MOB states that she feels infant tugging and denies pain. Observed infant at breast for about 10 minutes and continues after consult. Praise and reassurance given. BF education reinforced. MOB was instructed to call for f/u care as needed during her stay. Response: MOB pleased with infant position to breast and feeding. Verbalizes understanding of breast feeding education that was provided. Will call for f/u care as needed during her stay.

## 2020-05-08 NOTE — L&D DELIVERY SUMMARY NOTE
Department of Obstetrics and Gynecology  Spontaneous Vaginal Delivery Note      Pre-operative Diagnosis:  Term pregnancy, Spontaneous labor, Single fetus and Pregnancy complicated by: poor weight gain    Post-operative Diagnosis:  Living  infant(s) and Female    Information for the patient's :  Naveed Chavez [[id number]]                    Infant Wt:   Information for the patient's :  Naveed Chavez [[id number]]           APGARS:     Information for the patient's :  Naveed Chavez [[id number]]           Anesthesia:  epidural anesthesia    Application and Delivery:      26 y/o  female at 44 weeks 3 days gestation with [facility] 2020 presented to L&D early AM on 2020 with complaint of leakage of fluid and regular contractions. Patient was found to be grossly ruptured in active labor. Patient was admitted and epidural administered. Patient quickly progressed to complete dilatation and effacement. She then pushed for approximately 10 minutes and delivered a viable female  over an intact perineum. Brief suctioning was performed. No nuchal cord was noted. The shoulders and body were delivered immediately after the head and  was placed on maternal abdomen for further suctioning and drying. After brief delay cord was clamped and cut. A segment of cord was collected but later discarded due to fetal wellbeing. Cord blood was collected. Placenta was delivered complete intact with 3 vessel cord. Fundus was found to be firm and hemostasis assured. No cervical, vaginal or perineal lacerations were noted. A left labial laceration was re-approximated with 3-0 vicryl in the usual fashion. A straight catheterization was performed for 50 cc clear yellow urine. Rectum was found to be intact. Sponge, lap and needle counts were correct x 2.   Patient tolerated the procedure well and was left to recover in L&D room in stable

## 2020-05-09 ENCOUNTER — TELEPHONE (OUTPATIENT)
Dept: FAMILY MEDICINE CLINIC | Age: 25
End: 2020-05-09

## 2020-05-09 PROCEDURE — 2580000003 HC RX 258: Performed by: OBSTETRICS & GYNECOLOGY

## 2020-05-09 PROCEDURE — 1220000000 HC SEMI PRIVATE OB R&B

## 2020-05-09 PROCEDURE — 6370000000 HC RX 637 (ALT 250 FOR IP): Performed by: OBSTETRICS & GYNECOLOGY

## 2020-05-09 RX ORDER — IBUPROFEN 800 MG/1
800 TABLET ORAL EVERY 8 HOURS
Qty: 120 TABLET | Refills: 3 | Status: SHIPPED | OUTPATIENT
Start: 2020-05-09

## 2020-05-09 RX ADMIN — Medication 10 ML: at 21:44

## 2020-05-09 RX ADMIN — DOCUSATE SODIUM 100 MG: 100 CAPSULE, LIQUID FILLED ORAL at 01:55

## 2020-05-09 RX ADMIN — ACETAMINOPHEN 650 MG: 325 TABLET ORAL at 07:08

## 2020-05-09 RX ADMIN — DOCUSATE SODIUM 100 MG: 100 CAPSULE, LIQUID FILLED ORAL at 21:44

## 2020-05-09 RX ADMIN — Medication 10 ML: at 01:55

## 2020-05-09 RX ADMIN — IBUPROFEN 800 MG: 800 TABLET ORAL at 21:44

## 2020-05-09 RX ADMIN — DOCUSATE SODIUM 100 MG: 100 CAPSULE, LIQUID FILLED ORAL at 08:26

## 2020-05-09 ASSESSMENT — PAIN SCALES - GENERAL
PAINLEVEL_OUTOF10: 3
PAINLEVEL_OUTOF10: 0

## 2020-05-09 NOTE — TELEPHONE ENCOUNTER
Pt stated the her baby Herlinda Greco was born 5/8/2020, wants to know if anyone can see her for her new born np apt anytime next week. Please f/u.

## 2020-05-09 NOTE — PROGRESS NOTES
[facility] Ob/Gyn  Post Partum Progress Note    Subjective:   Carina Richardson is a 27 y.o. Cassi Sr s/p  at 39w3d, PPD #1. Patient is doing well today without complaints. Reports pain is well controlled. Reports lochia is mild. Tolerating regular diet without nausea or vomiting. Ambulating without difficulty, denies dizziness on standing. Voiding without difficulty. Denies headache, vision changes, RUQ pain. Denies chest pain, shortness of breath, fever, chills, calf pain. Breast feeding is going well. Objective:   Vitals:    20 0750   BP: 111/81   Pulse: 78   Resp: 16   Temp: 98.2 °F (36.8 °C)      GENERAL APPEARANCE: alert, well appearing, in no apparent distress  LUNGS: Respirations unlabored, no acute distress  HEART: Regular rate  ABDOMEN POSTPARTUM: Soft, non-tender, non-distended. No rebound or guarding. Fundus firm and non-tender below the umbilicus. EXTREMITIES: no tenderness in the calves or thighs, no edema  SKIN: normal coloration and turgor, no rashes  NEUROLOGIC: alert, oriented, normal speech, no focal findings or movement disorder noted    Impression: S/P Vaginal Delivery    Pertinent Labs:   Lab Results   Component Value Date    WBC 14.7 (H) 2020    HGB 10.3 (L) 2020    HCT 32.2 (L) 2020    MCV 81.2 2020     2020        Assessment:  1. Carina Richardson is a 27 y.o. Cassi Sr s/p  at 39w3d, PPD #1.       - Patient is doing well     - Meeting postpartum milestones    2. A pos / RI / GBS neg    Plan:     1. Continue routine postpartum care - see orders  2. Lactation support as needed      Disposition:   Continue inpatient management with likely discharge home PPD #2 pending infant discharge status.      Ethan Wilkse,

## 2020-05-10 VITALS
TEMPERATURE: 98.3 F | HEIGHT: 67 IN | RESPIRATION RATE: 16 BRPM | WEIGHT: 196 LBS | HEART RATE: 89 BPM | DIASTOLIC BLOOD PRESSURE: 83 MMHG | BODY MASS INDEX: 30.76 KG/M2 | SYSTOLIC BLOOD PRESSURE: 112 MMHG

## 2020-05-10 PROCEDURE — 6370000000 HC RX 637 (ALT 250 FOR IP): Performed by: OBSTETRICS & GYNECOLOGY

## 2020-05-10 RX ADMIN — DOCUSATE SODIUM 100 MG: 100 CAPSULE, LIQUID FILLED ORAL at 08:55

## 2020-05-10 RX ADMIN — IBUPROFEN 800 MG: 800 TABLET ORAL at 05:46

## 2020-05-10 NOTE — FLOWSHEET NOTE
Patient assessed for discharge readiness. The patient states that she feels well prepared to take this baby home. Instructed to have a rear facing car seat in the room prior to discharge. The birth certificate has been collected. The patient plans to schedule follow up appointment with the pediatrician at [address] [place] [address] for Monday/Tuesday. Oli Larger You Ready for Discharge? \" form completed and placed into the discharge binder. The patient states that her discomfort has been well controlled during her stay. Vaccine record has been reviewed with the patient.
Pt presents to triage via squad with complaints of SROM around 0100 this AM. States has been elian all day. Denies any vaginal bleeding.  Dr. Jerrica Ochoa aware of pt arrival.
__________________________  13. Do you have any other questions or concerns I can address today?  Y/N  __________________________________________________      Teaching During interview :_____________________________________________  ___________________________RN       Date:______________Time:________________

## 2020-05-10 NOTE — DISCHARGE SUMMARY
[facility]  Obstetric Discharge Summary        Admitting Diagnosis:   1. Sharon Webb is a 27 y.o. Justine [place] at 39w3d who presents with spontaneous labor  2. A pos / RI / GBS neg   3. Low maternal weight gain    Discharge Diagnosis:  1. Sharon Webb is a 27 y.o.  s/p  at 39w3d, PPD #2.    2. A pos / RI / GBS neg    Procedures:   1.     Referrals:    - Lactation Consultant      Information:   Delivery Date/Time: 2020 at 3:24AM  Gender: Female   Fetal Weight: 7lbs 4.2 oz  APGARS: 8 & 9   Fetal Feeding: Breast    Discharge Instructions:  Please call for increased pain not controlled by pain medications, significant vaginal bleeding greater than 1 pad/hour, temperature greater than 101 degrees F or any other concerns. Plan to follow up in 2 weeks. Diet:common adult    Activity:  Increase activity gradually. No heavy lifting or driving for 2 weeks. Pelvic rest for 6 weeks. No intercourse, tampons, douching, baths.        Medications:   - Motrin     Disposition: Home    Condition on discharge: Stable    Follow up: in 2 week(s)    Damaso Qureshi DO

## 2020-05-20 ENCOUNTER — TELEPHONE (OUTPATIENT)
Dept: OBGYN CLINIC | Age: 25
End: 2020-05-20

## 2020-05-21 ENCOUNTER — POSTPARTUM VISIT (OUTPATIENT)
Dept: OBGYN CLINIC | Age: 25
End: 2020-05-21

## 2020-05-21 ENCOUNTER — TELEPHONE (OUTPATIENT)
Dept: OBGYN CLINIC | Age: 25
End: 2020-05-21

## 2020-05-21 VITALS
DIASTOLIC BLOOD PRESSURE: 80 MMHG | SYSTOLIC BLOOD PRESSURE: 112 MMHG | HEART RATE: 98 BPM | WEIGHT: 178.2 LBS | BODY MASS INDEX: 27.91 KG/M2 | TEMPERATURE: 98.6 F

## 2020-05-21 PROBLEM — Z34.93 PRENATAL CARE IN THIRD TRIMESTER: Status: RESOLVED | Noted: 2019-11-25 | Resolved: 2020-05-08

## 2020-05-21 PROBLEM — O26.13 LOW WEIGHT GAIN IN PREGNANCY IN THIRD TRIMESTER: Status: RESOLVED | Noted: 2020-03-02 | Resolved: 2020-05-08

## 2020-05-21 PROCEDURE — 0503F POSTPARTUM CARE VISIT: CPT | Performed by: OBSTETRICS & GYNECOLOGY

## 2020-05-21 NOTE — PROGRESS NOTES
Maternal emotional well being screening form completed and reviewed with patient. Current score is 0. Patient given referral to [address] [place] [address] ([phone]):  No

## 2020-06-24 ENCOUNTER — POSTPARTUM VISIT (OUTPATIENT)
Dept: OBGYN CLINIC | Age: 25
End: 2020-06-24
Payer: COMMERCIAL

## 2020-06-24 VITALS
TEMPERATURE: 98.4 F | DIASTOLIC BLOOD PRESSURE: 64 MMHG | BODY MASS INDEX: 26.61 KG/M2 | HEIGHT: 68 IN | WEIGHT: 175.6 LBS | HEART RATE: 97 BPM | SYSTOLIC BLOOD PRESSURE: 102 MMHG

## 2020-06-24 LAB
CONTROL: NORMAL
PREGNANCY TEST URINE, POC: NEGATIVE

## 2020-06-24 PROCEDURE — 0503F POSTPARTUM CARE VISIT: CPT | Performed by: OBSTETRICS & GYNECOLOGY

## 2020-06-24 PROCEDURE — 81025 URINE PREGNANCY TEST: CPT | Performed by: OBSTETRICS & GYNECOLOGY

## 2020-06-24 NOTE — PROGRESS NOTES
(human papillomavirus) test positive  NILM/HPV other positive 10/2019  - plan to repeat cotesting October 2020    3.  History of intentional self-harm  Denies SI/HI today, moods stable  - continue to monitor    Dispo: for IUD insertion in 2 weeks  Ese Mckenna MD

## 2020-07-13 ENCOUNTER — OFFICE VISIT (OUTPATIENT)
Dept: OBGYN CLINIC | Age: 25
End: 2020-07-13
Payer: COMMERCIAL

## 2020-07-13 VITALS
BODY MASS INDEX: 26.6 KG/M2 | WEIGHT: 179.6 LBS | TEMPERATURE: 98.6 F | DIASTOLIC BLOOD PRESSURE: 72 MMHG | SYSTOLIC BLOOD PRESSURE: 118 MMHG | HEART RATE: 89 BPM | HEIGHT: 69 IN

## 2020-07-13 PROCEDURE — 58300 INSERT INTRAUTERINE DEVICE: CPT | Performed by: OBSTETRICS & GYNECOLOGY

## 2020-07-13 PROCEDURE — 81025 URINE PREGNANCY TEST: CPT | Performed by: OBSTETRICS & GYNECOLOGY

## 2020-07-13 NOTE — PROGRESS NOTES
IUD Insertion Procedure Note    Pre-operative Diagnosis: Desires Mirena IUD    Post-operative Diagnosis: Same    Procedure Details   Urine pregnancy test was done and result was negative. The risks (including infection, bleeding, pain, and uterine perforation) and benefits of the procedure were explained to the patient and written informed consent was obtained. Speculum was then placed in the vagina and a single tooth tenaculum was applied to the anterior lip of the cervix. The uterus was sounded to a depth of 8 cm. The IUD was then placed according to  instructions, applicator removed and strings trimmed to 3cm. The single tooth tenaculum was removed. Excellent hemostasis was assured. The speculum was then removed. The patient tolerated the procedure well. She was given return precautions and verbally stated understanding. IUD Information:  Type of IUD: Mirena IUD  Lot #[id number]  Expiration: 5/2022  Condition: Stable     Date of insertion: 7/13/2020  Anticipated date of removal: 2/05/3886    Complications: None    Plan:  The patient was advised to call for any fever or for prolonged or severe pain or bleeding. She was advised to use OTC analgesics as needed for mild to moderate pain.      Mich Kim MD

## 2020-07-14 LAB
CONTROL: NORMAL
PREGNANCY TEST URINE, POC: NEGATIVE

## 2020-08-12 ENCOUNTER — TELEPHONE (OUTPATIENT)
Dept: OBGYN CLINIC | Age: 25
End: 2020-08-12

## 2020-08-13 ENCOUNTER — OFFICE VISIT (OUTPATIENT)
Dept: OBGYN CLINIC | Age: 25
End: 2020-08-13
Payer: COMMERCIAL

## 2020-08-13 VITALS
DIASTOLIC BLOOD PRESSURE: 80 MMHG | WEIGHT: 180.4 LBS | BODY MASS INDEX: 26.64 KG/M2 | SYSTOLIC BLOOD PRESSURE: 140 MMHG | TEMPERATURE: 99.7 F | HEART RATE: 83 BPM

## 2020-08-13 PROCEDURE — 99212 OFFICE O/P EST SF 10 MIN: CPT | Performed by: OBSTETRICS & GYNECOLOGY

## 2020-08-13 PROCEDURE — 76856 US EXAM PELVIC COMPLETE: CPT | Performed by: OBSTETRICS & GYNECOLOGY

## 2020-08-13 NOTE — PROGRESS NOTES
Return Gyn Office Visit    CC:   Chief Complaint   Patient presents with    Contraception     Has not stopped bleeding. HPI:  23 y.o. Lynne Cha who presents to office for IUD check. Had it placed 7/13/2020. Doing well overall, has had some daily bleeding since it was placed, but is light and overall just a minor inconvenience. No pain with intercourse, no other cramps or pain. Overall happy with device. Objective:  BP (!) 140/80 (Site: Right Upper Arm, Position: Sitting, Cuff Size: Medium Adult)   Pulse 83   Temp 99.7 °F (37.6 °C) (Oral)   Wt 180 lb 6.4 oz (81.8 kg)   Breastfeeding Yes   BMI 26.64 kg/m²   General: Alert, well appearing, no acute distress  CV: well perfused  Resp: nonlabored respirations  Abdomen: Soft, nontender, nondistended     Imaging:   Impression    PELVIC ULTRASOUND without DOPPLER INTERROGATION    NON OB         DATE: 08/13/2020         PHYSICIAN: Elizabeth Rose M.D.         SONOGRAPHER: GERTRUDE Schroeder [facility]         INDICATION: IUD placement         TYPE OF SCAN: vaginal         FINDINGS:      The cul de sac is normal. No free fluid appreciated. The cervix is normal and not enlarged. Nabothian cyst/s is not noted within the uterine cervix. The uterus measures 6.38cm x 5.20cm x 3.55cm.      The uterus is anteverted. The endometrium measures 5.64 mm. IUD seen within the endometrial canal.    The myometrium is homogeneous in appearance. No uterine anomalies are noted.         The right ovary is present and multi-follicular.  The right ovary measures 3.59 cm x 2.25 cm x 1.91 cm. Ovary findings:  no masses seen. The right adnexa is normal.         The left ovary is present and multi-follicular.  The left ovary measures 2.65 cm x 2.43 cm x 1.68 cm.        Ovary findings:  no masses seen. The left adnexa is normal.         IMPRESSION: Normal appearing uterus with an IUD seen in appropriate position in the endometrial canal.  Normal appearing right and left ovary. Imaging is limited secondary to bowel gas. The patient is well aware of the limitations of ultrasound in the detection of anomalies of the abdomen and pelvis.             Assessment/Plan  1. Breakthrough bleeding with IUD  Patient with daily bleeding since nexplanon insertion, US as above shows it is positioned correctly in uterus. Typical bleeding precautions and return precautions discussed. Plan to moniotr bleeding for next 2-3 months.     2. Cervical high risk HPV (human papillomavirus) test positive  Pap 10/2018: NILM/HPV other+   - plan repeat cotesting in 2-3 months at time of annual exam    Dispo: PRN or in 2-3 months for annual/repeat Pap  Deandra Feliciano MD

## 2020-08-25 ENCOUNTER — TELEPHONE (OUTPATIENT)
Dept: FAMILY MEDICINE CLINIC | Age: 25
End: 2020-08-25

## 2020-10-01 ENCOUNTER — OFFICE VISIT (OUTPATIENT)
Dept: FAMILY MEDICINE CLINIC | Age: 25
End: 2020-10-01
Payer: COMMERCIAL

## 2020-10-01 VITALS
WEIGHT: 188 LBS | TEMPERATURE: 97.2 F | HEIGHT: 67 IN | HEART RATE: 88 BPM | SYSTOLIC BLOOD PRESSURE: 92 MMHG | BODY MASS INDEX: 29.51 KG/M2 | DIASTOLIC BLOOD PRESSURE: 60 MMHG | OXYGEN SATURATION: 99 %

## 2020-10-01 DIAGNOSIS — R53.83 FATIGUE, UNSPECIFIED TYPE: ICD-10-CM

## 2020-10-01 DIAGNOSIS — F41.9 ANXIETY: ICD-10-CM

## 2020-10-01 DIAGNOSIS — D64.9 ANEMIA, UNSPECIFIED TYPE: ICD-10-CM

## 2020-10-01 LAB
A/G RATIO: 1.7 (ref 1.1–2.2)
ALBUMIN SERPL-MCNC: 4.4 G/DL (ref 3.4–5)
ALP BLD-CCNC: 78 U/L (ref 40–129)
ALT SERPL-CCNC: 7 U/L (ref 10–40)
ANION GAP SERPL CALCULATED.3IONS-SCNC: 11 MMOL/L (ref 3–16)
AST SERPL-CCNC: 11 U/L (ref 15–37)
BILIRUB SERPL-MCNC: <0.2 MG/DL (ref 0–1)
BUN BLDV-MCNC: 10 MG/DL (ref 7–20)
CALCIUM SERPL-MCNC: 9.1 MG/DL (ref 8.3–10.6)
CHLORIDE BLD-SCNC: 105 MMOL/L (ref 99–110)
CO2: 22 MMOL/L (ref 21–32)
CREAT SERPL-MCNC: 0.6 MG/DL (ref 0.6–1.1)
GFR AFRICAN AMERICAN: >60
GFR NON-AFRICAN AMERICAN: >60
GLOBULIN: 2.6 G/DL
GLUCOSE BLD-MCNC: 87 MG/DL (ref 70–99)
HCT VFR BLD CALC: 39.4 % (ref 36–48)
HEMOGLOBIN: 12.5 G/DL (ref 12–16)
MCH RBC QN AUTO: 24.1 PG (ref 26–34)
MCHC RBC AUTO-ENTMCNC: 31.7 G/DL (ref 31–36)
MCV RBC AUTO: 76.1 FL (ref 80–100)
PDW BLD-RTO: 16.5 % (ref 12.4–15.4)
PLATELET # BLD: 375 K/UL (ref 135–450)
PMV BLD AUTO: 7.3 FL (ref 5–10.5)
POTASSIUM SERPL-SCNC: 4.4 MMOL/L (ref 3.5–5.1)
RBC # BLD: 5.18 M/UL (ref 4–5.2)
SODIUM BLD-SCNC: 138 MMOL/L (ref 136–145)
TOTAL PROTEIN: 7 G/DL (ref 6.4–8.2)
TSH SERPL DL<=0.05 MIU/L-ACNC: 1.92 UIU/ML (ref 0.27–4.2)
WBC # BLD: 6.6 K/UL (ref 4–11)

## 2020-10-01 PROCEDURE — 90471 IMMUNIZATION ADMIN: CPT | Performed by: PHYSICIAN ASSISTANT

## 2020-10-01 PROCEDURE — 99395 PREV VISIT EST AGE 18-39: CPT | Performed by: PHYSICIAN ASSISTANT

## 2020-10-01 PROCEDURE — 90686 IIV4 VACC NO PRSV 0.5 ML IM: CPT | Performed by: PHYSICIAN ASSISTANT

## 2020-10-01 RX ORDER — SERTRALINE HYDROCHLORIDE 25 MG/1
25 TABLET, FILM COATED ORAL DAILY
Qty: 30 TABLET | Refills: 3 | Status: SHIPPED | OUTPATIENT
Start: 2020-10-01 | End: 2020-10-28 | Stop reason: DRUGHIGH

## 2020-10-01 ASSESSMENT — PATIENT HEALTH QUESTIONNAIRE - PHQ9
2. FEELING DOWN, DEPRESSED OR HOPELESS: 2
SUM OF ALL RESPONSES TO PHQ QUESTIONS 1-9: 2
SUM OF ALL RESPONSES TO PHQ QUESTIONS 1-9: 2
SUM OF ALL RESPONSES TO PHQ9 QUESTIONS 1 & 2: 2
1. LITTLE INTEREST OR PLEASURE IN DOING THINGS: 0

## 2020-10-01 ASSESSMENT — ENCOUNTER SYMPTOMS
DIARRHEA: 0
ABDOMINAL PAIN: 0
COUGH: 0
SORE THROAT: 0
CONSTIPATION: 0
SHORTNESS OF BREATH: 0
VOMITING: 0
NAUSEA: 0
RHINORRHEA: 0

## 2020-10-01 NOTE — PROGRESS NOTES
10/1/2020  Tyler Mancini (: 1995)  23 y.o. HPI    Patient presents for annual physical.     Had her daughter 2020. Reports baby girl is doing well. Patient has increased anxiety and worsening depression over the last month. Stressed about finances. [de-identified] father is in and out of the picture, unable to hold down job. Patient reports some days she is fine and some days she can't keep it together. Poor sleep exacerbates feelings. Increased appetite with weight gain. Feels she is meeting her needs and the needs of her baby without difficulty. Does not worry about the safety of her daughter. Denies si/hi. She is breastfeeding. Review of Systems   Constitutional: Positive for fatigue. Negative for activity change, chills and fever. HENT: Negative for congestion, ear pain, rhinorrhea and sore throat. Eyes: Negative for visual disturbance. Respiratory: Negative for cough and shortness of breath. Cardiovascular: Negative for chest pain and palpitations. Gastrointestinal: Negative for abdominal pain, constipation, diarrhea, nausea and vomiting. Genitourinary: Negative for difficulty urinating and dysuria. Musculoskeletal: Negative for arthralgias and myalgias. Skin: Negative for rash. Neurological: Negative for dizziness, weakness and numbness. Psychiatric/Behavioral: Positive for decreased concentration, dysphoric mood and sleep disturbance. The patient is nervous/anxious. Allergies, past medical history, family history, and social history reviewed and unchanged from previous encounter.      Current Outpatient Medications   Medication Sig Dispense Refill    sertraline (ZOLOFT) 25 MG tablet Take 1 tablet by mouth daily 30 tablet 3    ibuprofen (ADVIL;MOTRIN) 800 MG tablet Take 1 tablet by mouth every 8 hours 120 tablet 3    Prenatal Vit-Fe Fumarate-FA (PRENATAL VITAMIN PO) Take by mouth       Current Facility-Administered Medications   Medication Dose Route Frequency Provider Last Rate Last Dose    levonorgestrel (MIRENA) IUD 52 mg 1 each  1 each Intrauterine Once Andressa Thakkar MD   1 each at 07/13/20 1705       Vitals:    10/01/20 0941   BP: 92/60   Site: Left Upper Arm   Position: Sitting   Cuff Size: Small Adult   Pulse: 88   Temp: 97.2 °F (36.2 °C)   TempSrc: Oral   SpO2: 99%  Comment: RA   Weight: 188 lb (85.3 kg)   Height: 5' 6.54\" (1.69 m)     Estimated body mass index is 29.86 kg/m² as calculated from the following:    Height as of this encounter: 5' 6.54\" (1.69 m). Weight as of this encounter: 188 lb (85.3 kg). Physical Exam  Constitutional:       General: She is not in acute distress. Appearance: She is well-developed. HENT:      Head: Normocephalic and atraumatic. Eyes:      Conjunctiva/sclera: Conjunctivae normal.      Pupils: Pupils are equal, round, and reactive to light. Neck:      Musculoskeletal: Neck supple. Cardiovascular:      Rate and Rhythm: Normal rate and regular rhythm. Heart sounds: Normal heart sounds. No murmur. Pulmonary:      Effort: Pulmonary effort is normal.      Breath sounds: Normal breath sounds. No wheezing. Abdominal:      General: Bowel sounds are normal.      Palpations: Abdomen is soft. Tenderness: There is no abdominal tenderness. Musculoskeletal:      Comments:     Lymphadenopathy:      Cervical: No cervical adenopathy. Skin:     General: Skin is warm and dry. Findings: No rash. Neurological:      Mental Status: She is alert and oriented to person, place, and time. Deep Tendon Reflexes: Reflexes are normal and symmetric. ASSESSMENT and PLAN:  Zulay Barahona was seen today for annual exam.    Diagnoses and all orders for this visit:    Adjustment disorder with mixed anxiety and depressed mood  -     sertraline (ZOLOFT) 25 MG tablet; Take 1 tablet by mouth daily  -     CBC; Future  -     COMPREHENSIVE METABOLIC PANEL; Future  -     TSH without Reflex;  Future    Anemia, unspecified type  - CBC; Future    Fatigue, unspecified type  -     TSH without Reflex; Future    Need for influenza vaccination  -     INFLUENZA, QUADV, 3 YRS AND OLDER, IM PF, PREFILL SYR OR SDV, 0.5ML (AFLURIA QUADV, PF)      Return in about 6 weeks (around 11/12/2020) for Depression, Anxiety.

## 2020-10-01 NOTE — PROGRESS NOTES
Vaccine Information Sheet, \"Influenza - Inactivated\"  given to Catherine Parent, or parent/legal guardian of  Catherine Parent and verbalized understanding. Patient responses:    Have you ever had a reaction to a flu vaccine? No  Are you able to eat eggs without adverse effects? Yes  Do you have any current illness? No  Have you ever had Guillian Dublin Syndrome? No    Flu vaccine given per order. Please see immunization tab.

## 2020-10-08 RX ORDER — FERROUS SULFATE 325(65) MG
325 TABLET ORAL
Qty: 90 TABLET | Refills: 1 | Status: SHIPPED | OUTPATIENT
Start: 2020-10-08

## 2020-10-22 ENCOUNTER — TELEPHONE (OUTPATIENT)
Dept: FAMILY MEDICINE CLINIC | Age: 25
End: 2020-10-22

## 2020-10-28 ENCOUNTER — VIRTUAL VISIT (OUTPATIENT)
Dept: FAMILY MEDICINE CLINIC | Age: 25
End: 2020-10-28
Payer: COMMERCIAL

## 2020-10-28 PROCEDURE — 99213 OFFICE O/P EST LOW 20 MIN: CPT | Performed by: PHYSICIAN ASSISTANT

## 2020-10-28 ASSESSMENT — ENCOUNTER SYMPTOMS
NAUSEA: 0
RHINORRHEA: 0
CONSTIPATION: 0
COUGH: 0
SORE THROAT: 0
SHORTNESS OF BREATH: 0
VOMITING: 0
DIARRHEA: 0
ABDOMINAL PAIN: 0

## 2020-10-28 NOTE — PROGRESS NOTES
10/28/2020  Precious Sparland (: 1995)  23 y.o. HPI  Patient started zoloft 25 mg at previous appointment for increase anxiety and depression. Notes improved motivation and decreased irritability. Feels it's working well. Some dizziness initially, but this has improved. Wonder if it oculd be better. Sleeping ok (has a new baby). Still breastfeeding. Review of Systems   Constitutional: Negative for activity change, chills and fever. HENT: Negative for congestion, ear pain, rhinorrhea and sore throat. Eyes: Negative for visual disturbance. Respiratory: Negative for cough and shortness of breath. Cardiovascular: Negative for chest pain and palpitations. Gastrointestinal: Negative for abdominal pain, constipation, diarrhea, nausea and vomiting. Genitourinary: Negative for difficulty urinating and dysuria. Musculoskeletal: Negative for arthralgias and myalgias. Skin: Negative for rash. Neurological: Negative for dizziness, weakness and numbness. Psychiatric/Behavioral: Positive for dysphoric mood (improved). Negative for sleep disturbance. The patient is nervous/anxious (improved). Allergies, past medical history, family history, and social history reviewed and unchanged from previous encounter.      Current Outpatient Medications   Medication Sig Dispense Refill    sertraline (ZOLOFT) 50 MG tablet Take 1 tablet by mouth daily 30 tablet 1    ferrous sulfate (IRON 325) 325 (65 Fe) MG tablet Take 1 tablet by mouth daily (with breakfast) 90 tablet 1    ibuprofen (ADVIL;MOTRIN) 800 MG tablet Take 1 tablet by mouth every 8 hours 120 tablet 3    Prenatal Vit-Fe Fumarate-FA (PRENATAL VITAMIN PO) Take by mouth       Current Facility-Administered Medications   Medication Dose Route Frequency Provider Last Rate Last Dose    levonorgestrel (MIRENA) IUD 52 mg 1 each  1 each Intrauterine Once Charito Santana MD   1 each at 20 1705       There were no vitals filed for this depressed mood  -     sertraline (ZOLOFT) 50 MG tablet; Take 1 tablet by mouth daily  - doing well on zoloft, could be better, increase dose to 50 mg.   - I've explained to her that drugs of the SSRI class can have side effects such as weight gain, sexual dysfunction, insomnia, headache, nausea. These medications are generally effective at alleviating symptoms of anxiety and/or depression. Let me know if significant side effects do occur. Return if symptoms worsen or fail to improve. Britney Zamudio is a 23 y.o. female being evaluated by a Virtual Visit (video visit) encounter to address concerns as mentioned above. A caregiver was present when appropriate. Due to this being a TeleHealth encounter (During XHZVV-17 public health emergency), evaluation of the following organ systems was limited: Vitals/Constitutional/EENT/Resp/CV/GI//MS/Neuro/Skin/Heme-Lymph-Imm. Pursuant to the emergency declaration under the [address] [place] [address] and the [employer] and [employer] Act, this Virtual Visit was conducted with patient's (and/or legal guardian's) consent, to reduce the patient's risk of exposure to COVID-19 and provide necessary medical care. The patient (and/or legal guardian) has also been advised to contact this office for worsening conditions or problems, and seek emergency medical treatment and/or call 911 if deemed necessary. Patient identification was verified at the start of the visit: Yes    Total time spent for this encounter: Not billed by time    Services were provided through a video synchronous discussion virtually to substitute for in-person clinic visit. Patient and provider were located at their individual homes. --KENDELL Heredia on 10/28/2020 at 10:18 AM    An electronic signature was used to authenticate this note.

## 2023-12-10 SDOH — HEALTH STABILITY: PHYSICAL HEALTH: ON AVERAGE, HOW MANY MINUTES DO YOU ENGAGE IN EXERCISE AT THIS LEVEL?: 30 MIN

## 2023-12-10 SDOH — HEALTH STABILITY: PHYSICAL HEALTH: ON AVERAGE, HOW MANY DAYS PER WEEK DO YOU ENGAGE IN MODERATE TO STRENUOUS EXERCISE (LIKE A BRISK WALK)?: 2 DAYS

## 2023-12-13 ENCOUNTER — OFFICE VISIT (OUTPATIENT)
Dept: PRIMARY CARE CLINIC | Age: 28
End: 2023-12-13
Payer: COMMERCIAL

## 2023-12-13 VITALS
SYSTOLIC BLOOD PRESSURE: 124 MMHG | TEMPERATURE: 97.6 F | HEIGHT: 69 IN | BODY MASS INDEX: 33.47 KG/M2 | OXYGEN SATURATION: 98 % | DIASTOLIC BLOOD PRESSURE: 72 MMHG | HEART RATE: 87 BPM | WEIGHT: 226 LBS

## 2023-12-13 DIAGNOSIS — F32.A ANXIETY AND DEPRESSION: ICD-10-CM

## 2023-12-13 DIAGNOSIS — R10.32 LEFT LOWER QUADRANT ABDOMINAL PAIN: ICD-10-CM

## 2023-12-13 DIAGNOSIS — Z13.220 LIPID SCREENING: ICD-10-CM

## 2023-12-13 DIAGNOSIS — Z13.29 THYROID DISORDER SCREENING: ICD-10-CM

## 2023-12-13 DIAGNOSIS — Z13.21 ENCOUNTER FOR VITAMIN DEFICIENCY SCREENING: ICD-10-CM

## 2023-12-13 DIAGNOSIS — F41.9 ANXIETY AND DEPRESSION: ICD-10-CM

## 2023-12-13 DIAGNOSIS — Z13.1 DIABETES MELLITUS SCREENING: ICD-10-CM

## 2023-12-13 DIAGNOSIS — Z97.5 IUD (INTRAUTERINE DEVICE) IN PLACE: ICD-10-CM

## 2023-12-13 LAB
25(OH)D3 SERPL-MCNC: 8.6 NG/ML
CHOLEST SERPL-MCNC: 146 MG/DL (ref 0–199)
HDLC SERPL-MCNC: 39 MG/DL (ref 40–60)
LDL CHOLESTEROL CALCULATED: 91 MG/DL
TRIGL SERPL-MCNC: 81 MG/DL (ref 0–150)
TSH SERPL DL<=0.005 MIU/L-ACNC: 2.47 UIU/ML (ref 0.27–4.2)
VLDLC SERPL CALC-MCNC: 16 MG/DL

## 2023-12-13 PROCEDURE — 99204 OFFICE O/P NEW MOD 45 MIN: CPT | Performed by: NURSE PRACTITIONER

## 2023-12-13 RX ORDER — ESCITALOPRAM OXALATE 10 MG/1
10 TABLET ORAL DAILY
Qty: 30 TABLET | Refills: 1 | Status: SHIPPED | OUTPATIENT
Start: 2023-12-13 | End: 2024-02-11

## 2023-12-13 SDOH — ECONOMIC STABILITY: HOUSING INSECURITY
IN THE LAST 12 MONTHS, WAS THERE A TIME WHEN YOU DID NOT HAVE A STEADY PLACE TO SLEEP OR SLEPT IN A SHELTER (INCLUDING NOW)?: NO

## 2023-12-13 SDOH — ECONOMIC STABILITY: INCOME INSECURITY: HOW HARD IS IT FOR YOU TO PAY FOR THE VERY BASICS LIKE FOOD, HOUSING, MEDICAL CARE, AND HEATING?: NOT VERY HARD

## 2023-12-13 SDOH — ECONOMIC STABILITY: FOOD INSECURITY: WITHIN THE PAST 12 MONTHS, YOU WORRIED THAT YOUR FOOD WOULD RUN OUT BEFORE YOU GOT MONEY TO BUY MORE.: NEVER TRUE

## 2023-12-13 SDOH — ECONOMIC STABILITY: FOOD INSECURITY: WITHIN THE PAST 12 MONTHS, THE FOOD YOU BOUGHT JUST DIDN'T LAST AND YOU DIDN'T HAVE MONEY TO GET MORE.: NEVER TRUE

## 2023-12-13 ASSESSMENT — PATIENT HEALTH QUESTIONNAIRE - PHQ9
1. LITTLE INTEREST OR PLEASURE IN DOING THINGS: 1
2. FEELING DOWN, DEPRESSED OR HOPELESS: 1
SUM OF ALL RESPONSES TO PHQ9 QUESTIONS 1 & 2: 2
4. FEELING TIRED OR HAVING LITTLE ENERGY: 2
SUM OF ALL RESPONSES TO PHQ QUESTIONS 1-9: 12
5. POOR APPETITE OR OVEREATING: 3
10. IF YOU CHECKED OFF ANY PROBLEMS, HOW DIFFICULT HAVE THESE PROBLEMS MADE IT FOR YOU TO DO YOUR WORK, TAKE CARE OF THINGS AT HOME, OR GET ALONG WITH OTHER PEOPLE: 1
SUM OF ALL RESPONSES TO PHQ QUESTIONS 1-9: 12
SUM OF ALL RESPONSES TO PHQ QUESTIONS 1-9: 12
8. MOVING OR SPEAKING SO SLOWLY THAT OTHER PEOPLE COULD HAVE NOTICED. OR THE OPPOSITE, BEING SO FIGETY OR RESTLESS THAT YOU HAVE BEEN MOVING AROUND A LOT MORE THAN USUAL: 0
6. FEELING BAD ABOUT YOURSELF - OR THAT YOU ARE A FAILURE OR HAVE LET YOURSELF OR YOUR FAMILY DOWN: 1
9. THOUGHTS THAT YOU WOULD BE BETTER OFF DEAD, OR OF HURTING YOURSELF: 0
7. TROUBLE CONCENTRATING ON THINGS, SUCH AS READING THE NEWSPAPER OR WATCHING TELEVISION: 1
3. TROUBLE FALLING OR STAYING ASLEEP: 3
SUM OF ALL RESPONSES TO PHQ QUESTIONS 1-9: 12

## 2023-12-13 ASSESSMENT — ANXIETY QUESTIONNAIRES
IF YOU CHECKED OFF ANY PROBLEMS ON THIS QUESTIONNAIRE, HOW DIFFICULT HAVE THESE PROBLEMS MADE IT FOR YOU TO DO YOUR WORK, TAKE CARE OF THINGS AT HOME, OR GET ALONG WITH OTHER PEOPLE: SOMEWHAT DIFFICULT
7. FEELING AFRAID AS IF SOMETHING AWFUL MIGHT HAPPEN: 1
2. NOT BEING ABLE TO STOP OR CONTROL WORRYING: 0
4. TROUBLE RELAXING: 2
5. BEING SO RESTLESS THAT IT IS HARD TO SIT STILL: 2
GAD7 TOTAL SCORE: 7
6. BECOMING EASILY ANNOYED OR IRRITABLE: 1
3. WORRYING TOO MUCH ABOUT DIFFERENT THINGS: 0
1. FEELING NERVOUS, ANXIOUS, OR ON EDGE: 1

## 2023-12-13 ASSESSMENT — ENCOUNTER SYMPTOMS
CHEST TIGHTNESS: 0
SHORTNESS OF BREATH: 0

## 2023-12-13 NOTE — PROGRESS NOTES
12/13/2023    Chief Complaint   Patient presents with    Contraception     Would like talk about IUD removal     New Patient       Magdaleno Magallanes is a 34 y.o. female, presents today to establish care. Patient reports it has been over 4 year since she last saw a PCP. HPI   IUD (Mirena)  Inserted on July 2020. Feels Mirena is causing problems that started ~1.5 years and symptoms getting progressively worse. She reports cramps on left lower abdomen that radiates down leg that start 1 week prior to starting period and she reports mood changes. Still has periods - irregular and typically last 3 days- 1 \"regular day than spotting on day 2 & 3. Pt would like to have IUD removed- patient to check with her insurance for list of OB/GYN and to schedule an appointment. Patient agrees to plan with verbal understanding. Positive Depression and Anxiety Screening  History of post-partum depression. Previously took Zoloft which made her very angry. Took medication for only a couple months. She has been thinking to start a different antidepressant. Started therapy ~ 1 year ago- going once monthly. Last saw therapist 3 months ago due to job change- plans to restart in Jan 2024. Denies homicidal and suicidal ideation. Pt is agreeable to start medication today.      PLAN:  - Start Lexapro 10 mg        12/13/2023     7:33 AM 10/1/2020     9:45 AM 10/7/2019     8:56 AM 9/19/2018    12:56 PM   PHQ Scores   PHQ2 Score 2 2 0 0   PHQ9 Score 12 2 0 0     Interpretation of Total Score Depression Severity: 1-4 = Minimal depression, 5-9 = Mild depression, 10-14 = Moderate depression, 15-19 = Moderately severe depression, 20-27 = Severe depression         12/13/2023     7:33 AM 10/1/2020     9:45 AM 10/7/2019     8:56 AM   PHQ-9    Little interest or pleasure in doing things 1 0 0   Feeling down, depressed, or hopeless 1 2 0   Trouble falling or staying asleep, or sleeping too much 3     Feeling tired or having

## 2023-12-13 NOTE — PATIENT INSTRUCTIONS
Anxiety and Depression  - Practice self care and daily fresh air for 20 minutes. - Regular exercise program (work up to 150 minutes per week)  - Practice self relaxation with music or meditation etc. Phone apps such as [employer]: Mediatation and Relaxation, or Calm. - The crisis number for [facility] is [phone]. You can use this number at any time to access emergency mental health services. - Text HOME to 735434 from anywhere in the [facility], anytime, about any type of crisis. Crisis Text Line serves anyone, in any type of crisis, providing access to free, 24/7. The first two responses are automated. They tell you that you're being connected with a Crisis Counselor, and invite you to share a bit more. The Crisis Counselor is a trained volunteer, not a professional.  It usually takes less than five minutes to connect you with a Crisis Counselor. The goal of any conversation is to get you to a calm, safe place.

## 2023-12-14 LAB
EST. AVERAGE GLUCOSE BLD GHB EST-MCNC: 102.5 MG/DL
HBA1C MFR BLD: 5.2 %

## 2024-01-10 ENCOUNTER — TELEPHONE (OUTPATIENT)
Dept: PRIMARY CARE CLINIC | Age: 29
End: 2024-01-10

## 2024-01-10 ASSESSMENT — PATIENT HEALTH QUESTIONNAIRE - PHQ9
3. TROUBLE FALLING OR STAYING ASLEEP: SEVERAL DAYS
10. IF YOU CHECKED OFF ANY PROBLEMS, HOW DIFFICULT HAVE THESE PROBLEMS MADE IT FOR YOU TO DO YOUR WORK, TAKE CARE OF THINGS AT HOME, OR GET ALONG WITH OTHER PEOPLE: 1
8. MOVING OR SPEAKING SO SLOWLY THAT OTHER PEOPLE COULD HAVE NOTICED. OR THE OPPOSITE, BEING SO FIGETY OR RESTLESS THAT YOU HAVE BEEN MOVING AROUND A LOT MORE THAN USUAL: 0
SUM OF ALL RESPONSES TO PHQ QUESTIONS 1-9: 4
9. THOUGHTS THAT YOU WOULD BE BETTER OFF DEAD, OR OF HURTING YOURSELF: 0
SUM OF ALL RESPONSES TO PHQ QUESTIONS 1-9: 4
6. FEELING BAD ABOUT YOURSELF - OR THAT YOU ARE A FAILURE OR HAVE LET YOURSELF OR YOUR FAMILY DOWN: 0
1. LITTLE INTEREST OR PLEASURE IN DOING THINGS: 1
8. MOVING OR SPEAKING SO SLOWLY THAT OTHER PEOPLE COULD HAVE NOTICED. OR THE OPPOSITE - BEING SO FIDGETY OR RESTLESS THAT YOU HAVE BEEN MOVING AROUND A LOT MORE THAN USUAL: NOT AT ALL
2. FEELING DOWN, DEPRESSED OR HOPELESS: 0
3. TROUBLE FALLING OR STAYING ASLEEP: 1
7. TROUBLE CONCENTRATING ON THINGS, SUCH AS READING THE NEWSPAPER OR WATCHING TELEVISION: 1
10. IF YOU CHECKED OFF ANY PROBLEMS, HOW DIFFICULT HAVE THESE PROBLEMS MADE IT FOR YOU TO DO YOUR WORK, TAKE CARE OF THINGS AT HOME, OR GET ALONG WITH OTHER PEOPLE: SOMEWHAT DIFFICULT
2. FEELING DOWN, DEPRESSED OR HOPELESS: NOT AT ALL
4. FEELING TIRED OR HAVING LITTLE ENERGY: 1
1. LITTLE INTEREST OR PLEASURE IN DOING THINGS: SEVERAL DAYS
4. FEELING TIRED OR HAVING LITTLE ENERGY: SEVERAL DAYS
SUM OF ALL RESPONSES TO PHQ QUESTIONS 1-9: 4
SUM OF ALL RESPONSES TO PHQ QUESTIONS 1-9: 4
6. FEELING BAD ABOUT YOURSELF - OR THAT YOU ARE A FAILURE OR HAVE LET YOURSELF OR YOUR FAMILY DOWN: NOT AT ALL
5. POOR APPETITE OR OVEREATING: 0
9. THOUGHTS THAT YOU WOULD BE BETTER OFF DEAD, OR OF HURTING YOURSELF: NOT AT ALL
SUM OF ALL RESPONSES TO PHQ9 QUESTIONS 1 & 2: 1
5. POOR APPETITE OR OVEREATING: NOT AT ALL
7. TROUBLE CONCENTRATING ON THINGS, SUCH AS READING THE NEWSPAPER OR WATCHING TELEVISION: SEVERAL DAYS
SUM OF ALL RESPONSES TO PHQ QUESTIONS 1-9: 4

## 2024-01-11 ENCOUNTER — OFFICE VISIT (OUTPATIENT)
Dept: PRIMARY CARE CLINIC | Age: 29
End: 2024-01-11
Payer: COMMERCIAL

## 2024-01-11 VITALS
HEIGHT: 69 IN | SYSTOLIC BLOOD PRESSURE: 118 MMHG | OXYGEN SATURATION: 98 % | HEART RATE: 79 BPM | WEIGHT: 227 LBS | BODY MASS INDEX: 33.62 KG/M2 | DIASTOLIC BLOOD PRESSURE: 70 MMHG

## 2024-01-11 DIAGNOSIS — F41.9 ANXIETY AND DEPRESSION: Primary | ICD-10-CM

## 2024-01-11 DIAGNOSIS — E55.9 VITAMIN D DEFICIENCY: ICD-10-CM

## 2024-01-11 DIAGNOSIS — F32.A ANXIETY AND DEPRESSION: Primary | ICD-10-CM

## 2024-01-11 PROCEDURE — 99214 OFFICE O/P EST MOD 30 MIN: CPT | Performed by: NURSE PRACTITIONER

## 2024-01-11 RX ORDER — ESCITALOPRAM OXALATE 20 MG/1
20 TABLET ORAL DAILY
Qty: 30 TABLET | Refills: 1 | Status: SHIPPED | OUTPATIENT
Start: 2024-01-11 | End: 2024-03-11

## 2024-01-11 RX ORDER — ERGOCALCIFEROL 1.25 MG/1
CAPSULE ORAL
Qty: 12 CAPSULE | Refills: 1 | Status: SHIPPED | OUTPATIENT
Start: 2024-01-11

## 2024-01-11 ASSESSMENT — ANXIETY QUESTIONNAIRES
7. FEELING AFRAID AS IF SOMETHING AWFUL MIGHT HAPPEN: 0
5. BEING SO RESTLESS THAT IT IS HARD TO SIT STILL: 1
4. TROUBLE RELAXING: 2
1. FEELING NERVOUS, ANXIOUS, OR ON EDGE: 1
3. WORRYING TOO MUCH ABOUT DIFFERENT THINGS: 0
IF YOU CHECKED OFF ANY PROBLEMS ON THIS QUESTIONNAIRE, HOW DIFFICULT HAVE THESE PROBLEMS MADE IT FOR YOU TO DO YOUR WORK, TAKE CARE OF THINGS AT HOME, OR GET ALONG WITH OTHER PEOPLE: SOMEWHAT DIFFICULT
6. BECOMING EASILY ANNOYED OR IRRITABLE: 2
2. NOT BEING ABLE TO STOP OR CONTROL WORRYING: 0
GAD7 TOTAL SCORE: 6

## 2024-01-11 ASSESSMENT — ENCOUNTER SYMPTOMS
SHORTNESS OF BREATH: 0
CHEST TIGHTNESS: 0

## 2024-01-11 NOTE — PATIENT INSTRUCTIONS
Anxiety and Depression  - Practice self care and daily fresh air for 20 minutes.  - Regular exercise program (work up to 150 minutes per week)  - Practice self relaxation with music or meditation etc. Phone apps such as [employer]: Mediatation and Relaxation, or Calm.    - The crisis number for [facility] is [phone].  You can use this number at any time to access emergency mental health services.    - Text HOME to 622891 from anywhere in the [place], anytime, about any type of crisis.  Crisis Text Line serves anyone, in any type of crisis, providing access to free, 24/7.  The first two responses are automated. They tell you that you're being connected with a Crisis Counselor, and invite you to share a bit more.  The Crisis Counselor is a trained volunteer, not a professional.  It usually takes less than five minutes to connect you with a Crisis Counselor. The goal of any conversation is to get you to a calm, safe place.

## 2024-01-11 NOTE — PROGRESS NOTES
1/11/2024    Chief Complaint   Patient presents with    Follow-up      3 week follow up of anxiety, depression (started Lexapro 10 mg) & review labs       Malinda Kohler is a 28 y.o. female, presents today for 3 week follow up of anxiety, depression (started Lexapro 10 mg) & review labs     HPI   Depression and Anxiety   History of post-partum depression.     Screen positive for anxiety and depression at first visit on 12/13/2023 and Lexapro 10 mg was prescribed.    Patient reports anxiety and depression has improved since that time, however reports irritability after she gets home from work. States she is fine while at work. Reports difficult home situation with roommate. She will be moving out in 2 months, stating \"everything is getting on my nerves\".     Denies homicidal and suicidal ideation.   She is taking medication as prescribed and tolerating well.  Denies side effects from medication.    Previously saw therapist on regular basis until approximately 3 months ago due to a job change. Scheduled therapy tomorrow, but has to reschedule.       Plan today:  - Increase Lexapro from 10 mg to 20 mg      Previous antidepressant:  -Zoloft: Caused anger (started postpartum - took only for a couple months)          12/13/2023     7:34 AM   JESENIA 7 SCORE   JESENIA-7 Total Score 7     Interpretation of JESENIA-7 score: 5-9 = mild anxiety, 10-14 = moderate anxiety, 15+ = severe anxiety. Recommend referral to behavioral health for scores 10 or greater.       12/13/2023     7:34 AM   JESENIA-7 SCREENING   Feeling nervous, anxious, or on edge Several days   Not being able to stop or control worrying Not at all   Worrying too much about different things Not at all   Trouble relaxing More than half the days   Being so restless that it is hard to sit still More than half the days   Becoming easily annoyed or irritable Several days   Feeling afraid as if something awful might happen Several days   JESENIA-7 Total Score 7   How difficult have

## 2024-01-12 DIAGNOSIS — F41.9 ANXIETY AND DEPRESSION: ICD-10-CM

## 2024-01-12 DIAGNOSIS — F32.A ANXIETY AND DEPRESSION: ICD-10-CM

## 2024-01-12 RX ORDER — ESCITALOPRAM OXALATE 20 MG/1
20 TABLET ORAL DAILY
Qty: 30 TABLET | Refills: 1 | OUTPATIENT
Start: 2024-01-12 | End: 2024-03-12

## 2024-01-12 NOTE — TELEPHONE ENCOUNTER
Medication:   Requested Prescriptions      No prescriptions requested or ordered in this encounter      Last Filled:      Patient Phone Number: [phone] (home)     Last appt: 1/11/2024   Next appt: 2/1/2024    Last OARRS:        No data to display              PDMP Monitoring:    Last PDMP Vinicio as Reviewed ([place]):  Review User Review Instant Review Result          Preferred Pharmacy:   [facility]/pharmacy #[id number] - [place], [place] - [address] [facility] -  [phone] - F [phone]  [address] [facility] [address]  Phone: [phone] Fax: [phone]

## 2024-01-18 DIAGNOSIS — F32.A ANXIETY AND DEPRESSION: ICD-10-CM

## 2024-01-18 DIAGNOSIS — F41.9 ANXIETY AND DEPRESSION: ICD-10-CM

## 2024-01-18 RX ORDER — ESCITALOPRAM OXALATE 20 MG/1
20 TABLET ORAL DAILY
Qty: 30 TABLET | Refills: 1 | OUTPATIENT
Start: 2024-01-18 | End: 2024-03-18

## 2024-01-18 NOTE — TELEPHONE ENCOUNTER
Medication:   Requested Prescriptions      No prescriptions requested or ordered in this encounter      Last Filled:  1/11/2024    Patient Phone Number: [phone] (home)     Last appt: 1/11/2024   Next appt: 2/1/2024    Last OARRS:        No data to display              PDMP Monitoring:    Last PDMP Vinicio as Reviewed ([place]):  Review User Review Instant Review Result          Preferred Pharmacy:   [facility]/pharmacy #[id number] - [place], [place] - [address] [facility] [phone] - F [phone]  [address] [facility] [address]  Phone: [phone] Fax: [phone]

## 2024-02-01 ENCOUNTER — OFFICE VISIT (OUTPATIENT)
Dept: PRIMARY CARE CLINIC | Age: 29
End: 2024-02-01
Payer: COMMERCIAL

## 2024-02-01 VITALS
WEIGHT: 226.4 LBS | BODY MASS INDEX: 33.53 KG/M2 | SYSTOLIC BLOOD PRESSURE: 116 MMHG | TEMPERATURE: 97.6 F | HEART RATE: 93 BPM | OXYGEN SATURATION: 98 % | HEIGHT: 69 IN | DIASTOLIC BLOOD PRESSURE: 70 MMHG

## 2024-02-01 DIAGNOSIS — F41.9 ANXIETY AND DEPRESSION: Primary | ICD-10-CM

## 2024-02-01 DIAGNOSIS — F32.A ANXIETY AND DEPRESSION: Primary | ICD-10-CM

## 2024-02-01 PROCEDURE — 99213 OFFICE O/P EST LOW 20 MIN: CPT | Performed by: NURSE PRACTITIONER

## 2024-02-01 RX ORDER — ESCITALOPRAM OXALATE 20 MG/1
20 TABLET ORAL DAILY
Qty: 90 TABLET | Refills: 0 | Status: SHIPPED | OUTPATIENT
Start: 2024-02-01 | End: 2024-05-01

## 2024-02-01 ASSESSMENT — ENCOUNTER SYMPTOMS
SHORTNESS OF BREATH: 0
CHEST TIGHTNESS: 0

## 2024-02-01 NOTE — PROGRESS NOTES
2/1/2024    Chief Complaint   Patient presents with    Follow-up     3 week follow up of anxiety, depression (Lexapro increased to 20 mg)       Malinda Kohler is a 28 y.o. female, presents today for 3 week follow up of anxiety, depression (Lexapro increased to 20 mg).    HPI   Depression and Anxiety   History of post-partum depression.   The patient screened positive for anxiety and depression at first visit on 12/13/2023 and Lexapro 10 mg was prescribed.    At last visit 3 weeks ago Lexapro was increased from 10 mg to 20 mg  Current medication: Lexapro 20 mg daily  She is taking as prescribed and tolerating well.  Denies side effects from medication.    Patient reports anxiety and depression has improved and feeling \"a lot better\" since Lexapro was increased to 20 mg daily.  Denies homicidal and suicidal ideation.       Previously saw therapist on regular basis until approximately 3 months ago due to a job change. Scheduled therapy tomorrow, but has to reschedule.       Previous antidepressant:  -Zoloft: Caused anger (started postpartum - took only for a couple months)          1/11/2024    10:30 AM 12/13/2023     7:34 AM   JESENIA 7 SCORE   JESENIA-7 Total Score 6 7     Interpretation of JESENIA-7 score: 5-9 = mild anxiety, 10-14 = moderate anxiety, 15+ = severe anxiety. Recommend referral to behavioral health for scores 10 or greater.       1/11/2024    10:30 AM 12/13/2023     7:34 AM   JESENIA-7 SCREENING   Feeling nervous, anxious, or on edge Several days Several days   Not being able to stop or control worrying Not at all Not at all   Worrying too much about different things Not at all Not at all   Trouble relaxing More than half the days More than half the days   Being so restless that it is hard to sit still Several days More than half the days   Becoming easily annoyed or irritable More than half the days Several days   Feeling afraid as if something awful might happen Not at all Several days   JESENIA-7 Total Score 6 7

## 2024-02-01 NOTE — PATIENT INSTRUCTIONS
Anxiety and Depression  - Practice self care and daily fresh air for 20 minutes.  - Regular exercise program (work up to 150 minutes per week)  - Practice self relaxation with music or meditation etc. Phone apps such as [employer]: Mediatation and Relaxation, or Calm.    - The crisis number for [facility] is [phone].  You can use this number at any time to access emergency mental health services.    - Text HOME to 237598 from anywhere in the [place], anytime, about any type of crisis.  Crisis Text Line serves anyone, in any type of crisis, providing access to free, 24/7.  The first two responses are automated. They tell you that you're being connected with a Crisis Counselor, and invite you to share a bit more.  The Crisis Counselor is a trained volunteer, not a professional.  It usually takes less than five minutes to connect you with a Crisis Counselor. The goal of any conversation is to get you to a calm, safe place.

## 2024-05-01 ENCOUNTER — OFFICE VISIT (OUTPATIENT)
Dept: PRIMARY CARE CLINIC | Age: 29
End: 2024-05-01
Payer: COMMERCIAL

## 2024-05-01 VITALS
TEMPERATURE: 97.7 F | HEIGHT: 69 IN | SYSTOLIC BLOOD PRESSURE: 116 MMHG | OXYGEN SATURATION: 95 % | HEART RATE: 81 BPM | DIASTOLIC BLOOD PRESSURE: 66 MMHG | WEIGHT: 235 LBS | BODY MASS INDEX: 34.8 KG/M2

## 2024-05-01 DIAGNOSIS — E55.9 VITAMIN D DEFICIENCY: ICD-10-CM

## 2024-05-01 DIAGNOSIS — F32.A ANXIETY AND DEPRESSION: Primary | ICD-10-CM

## 2024-05-01 DIAGNOSIS — F41.9 ANXIETY AND DEPRESSION: Primary | ICD-10-CM

## 2024-05-01 LAB — 25(OH)D3 SERPL-MCNC: 30.4 NG/ML

## 2024-05-01 PROCEDURE — 99214 OFFICE O/P EST MOD 30 MIN: CPT | Performed by: NURSE PRACTITIONER

## 2024-05-01 RX ORDER — ESCITALOPRAM OXALATE 20 MG/1
20 TABLET ORAL DAILY
Qty: 90 TABLET | Refills: 0 | Status: SHIPPED | OUTPATIENT
Start: 2024-05-01 | End: 2024-07-30

## 2024-05-01 RX ORDER — ERGOCALCIFEROL 1.25 MG/1
CAPSULE ORAL
Qty: 12 CAPSULE | Refills: 1 | Status: SHIPPED | OUTPATIENT
Start: 2024-05-01

## 2024-05-01 ASSESSMENT — ANXIETY QUESTIONNAIRES
7. FEELING AFRAID AS IF SOMETHING AWFUL MIGHT HAPPEN: NOT AT ALL
1. FEELING NERVOUS, ANXIOUS, OR ON EDGE: NOT AT ALL
4. TROUBLE RELAXING: SEVERAL DAYS
3. WORRYING TOO MUCH ABOUT DIFFERENT THINGS: NOT AT ALL
2. NOT BEING ABLE TO STOP OR CONTROL WORRYING: NOT AT ALL
6. BECOMING EASILY ANNOYED OR IRRITABLE: SEVERAL DAYS
IF YOU CHECKED OFF ANY PROBLEMS ON THIS QUESTIONNAIRE, HOW DIFFICULT HAVE THESE PROBLEMS MADE IT FOR YOU TO DO YOUR WORK, TAKE CARE OF THINGS AT HOME, OR GET ALONG WITH OTHER PEOPLE: SOMEWHAT DIFFICULT
GAD7 TOTAL SCORE: 2
5. BEING SO RESTLESS THAT IT IS HARD TO SIT STILL: NOT AT ALL

## 2024-05-01 ASSESSMENT — ENCOUNTER SYMPTOMS
CHEST TIGHTNESS: 0
SHORTNESS OF BREATH: 0

## 2024-05-01 ASSESSMENT — PATIENT HEALTH QUESTIONNAIRE - PHQ9
6. FEELING BAD ABOUT YOURSELF - OR THAT YOU ARE A FAILURE OR HAVE LET YOURSELF OR YOUR FAMILY DOWN: NOT AT ALL
10. IF YOU CHECKED OFF ANY PROBLEMS, HOW DIFFICULT HAVE THESE PROBLEMS MADE IT FOR YOU TO DO YOUR WORK, TAKE CARE OF THINGS AT HOME, OR GET ALONG WITH OTHER PEOPLE: SOMEWHAT DIFFICULT
8. MOVING OR SPEAKING SO SLOWLY THAT OTHER PEOPLE COULD HAVE NOTICED. OR THE OPPOSITE, BEING SO FIGETY OR RESTLESS THAT YOU HAVE BEEN MOVING AROUND A LOT MORE THAN USUAL: NOT AT ALL
SUM OF ALL RESPONSES TO PHQ QUESTIONS 1-9: 6
SUM OF ALL RESPONSES TO PHQ9 QUESTIONS 1 & 2: 2
1. LITTLE INTEREST OR PLEASURE IN DOING THINGS: SEVERAL DAYS
SUM OF ALL RESPONSES TO PHQ QUESTIONS 1-9: 6
2. FEELING DOWN, DEPRESSED OR HOPELESS: SEVERAL DAYS
7. TROUBLE CONCENTRATING ON THINGS, SUCH AS READING THE NEWSPAPER OR WATCHING TELEVISION: NOT AT ALL
SUM OF ALL RESPONSES TO PHQ QUESTIONS 1-9: 6
3. TROUBLE FALLING OR STAYING ASLEEP: MORE THAN HALF THE DAYS
SUM OF ALL RESPONSES TO PHQ QUESTIONS 1-9: 6
5. POOR APPETITE OR OVEREATING: NOT AT ALL
4. FEELING TIRED OR HAVING LITTLE ENERGY: MORE THAN HALF THE DAYS
9. THOUGHTS THAT YOU WOULD BE BETTER OFF DEAD, OR OF HURTING YOURSELF: NOT AT ALL

## 2024-05-01 NOTE — PATIENT INSTRUCTIONS
Anxiety and Depression  - Practice self care and daily fresh air for 20 minutes.  - Regular exercise program (work up to 150 minutes per week)  - Practice self relaxation with music or meditation etc. Phone apps such as [employer]: Mediatation and Relaxation, or Calm.    - The crisis number for [facility] is [phone].  You can use this number at any time to access emergency mental health services.    - Text HOME to 920000 from anywhere in the [place], anytime, about any type of crisis.  Crisis Text Line serves anyone, in any type of crisis, providing access to free, 24/7.  The first two responses are automated. They tell you that you're being connected with a Crisis Counselor, and invite you to share a bit more.  The Crisis Counselor is a trained volunteer, not a professional.  It usually takes less than five minutes to connect you with a Crisis Counselor. The goal of any conversation is to get you to a calm, safe place.

## 2024-05-01 NOTE — PROGRESS NOTES
5/1/2024    Chief Complaint   Patient presents with    3 Month Follow-Up     Anxiety and depression, vitamin D deficiency       Malinda Kohler is a 28 y.o. female, presents today for 3 month follow up of anxiety, depression, vitamin D deficiency.      HPI   Depression and Anxiety   History of post-partum depression.   The patient screened positive for anxiety and depression at first visit on 12/13/2023 and Lexapro 10 mg was prescribed.      Current medication: Lexapro 20 mg daily  She is taking as prescribed and tolerating well.  Denies side effects from medication.    Patient reports anxiety and depression is well-controlled with current medication. Feeling better since dosage was increased from 10 mg to 20 mg.    Denies homicidal and suicidal ideation.       Previously saw therapist on regular basis, plans to resume therapy in the near future.     Previous antidepressant:  -Zoloft: Caused anger (started postpartum - took only for a couple months)          5/1/2024     8:00 AM 1/11/2024    10:30 AM 12/13/2023     7:34 AM   JESENIA 7 SCORE   JESENIA-7 Total Score 2 6 7     Interpretation of JESENIA-7 score: 5-9 = mild anxiety, 10-14 = moderate anxiety, 15+ = severe anxiety. Recommend referral to behavioral health for scores 10 or greater.       5/1/2024     8:00 AM 1/11/2024    10:30 AM 12/13/2023     7:34 AM   JESENIA-7 SCREENING   Feeling nervous, anxious, or on edge Not at all Several days Several days   Not being able to stop or control worrying Not at all Not at all Not at all   Worrying too much about different things Not at all Not at all Not at all   Trouble relaxing Several days More than half the days More than half the days   Being so restless that it is hard to sit still Not at all Several days More than half the days   Becoming easily annoyed or irritable Several days More than half the days Several days   Feeling afraid as if something awful might happen Not at all Not at all Several days   JESENIA-7 Total Score 2 6

## 2024-07-26 DIAGNOSIS — F32.A ANXIETY AND DEPRESSION: ICD-10-CM

## 2024-07-26 DIAGNOSIS — F41.9 ANXIETY AND DEPRESSION: ICD-10-CM

## 2024-07-26 RX ORDER — ESCITALOPRAM OXALATE 20 MG/1
20 TABLET ORAL DAILY
Qty: 90 TABLET | Refills: 0 | Status: SHIPPED | OUTPATIENT
Start: 2024-07-26

## 2024-07-26 NOTE — TELEPHONE ENCOUNTER
Recent Visits  Date Type Provider Dept   05/01/24 Office Visit Diana Harrison APRN - CNP [facility]   02/01/24 Office Visit Diana Harrison APRN - [facility]   01/11/24 Office Visit Diana Harrison APRN - [facility]   12/13/23 Office Visit Diana Harrison APRN - [facility]   Showing recent visits within past 540 days with a meds authorizing provider and meeting all other requirements  Future Appointments  Date Type Provider Dept   08/01/24 Appointment Diana Harrison APRN - [facility]   Showing future appointments within next 150 days with a meds authorizing provider and meeting all other requirements

## 2024-08-01 ENCOUNTER — OFFICE VISIT (OUTPATIENT)
Dept: PRIMARY CARE CLINIC | Age: 29
End: 2024-08-01
Payer: COMMERCIAL

## 2024-08-01 VITALS
OXYGEN SATURATION: 98 % | HEIGHT: 69 IN | TEMPERATURE: 98.4 F | DIASTOLIC BLOOD PRESSURE: 76 MMHG | HEART RATE: 72 BPM | BODY MASS INDEX: 35.01 KG/M2 | WEIGHT: 236.4 LBS | SYSTOLIC BLOOD PRESSURE: 124 MMHG

## 2024-08-01 DIAGNOSIS — F32.A ANXIETY AND DEPRESSION: Primary | ICD-10-CM

## 2024-08-01 DIAGNOSIS — F41.9 ANXIETY AND DEPRESSION: Primary | ICD-10-CM

## 2024-08-01 DIAGNOSIS — R68.82 DECREASED LIBIDO: ICD-10-CM

## 2024-08-01 DIAGNOSIS — E55.9 VITAMIN D DEFICIENCY: ICD-10-CM

## 2024-08-01 PROCEDURE — 99214 OFFICE O/P EST MOD 30 MIN: CPT | Performed by: NURSE PRACTITIONER

## 2024-08-01 RX ORDER — BUPROPION HYDROCHLORIDE 150 MG/1
150 TABLET, EXTENDED RELEASE ORAL 2 TIMES DAILY
Qty: 180 TABLET | Refills: 0 | Status: SHIPPED | OUTPATIENT
Start: 2024-08-01 | End: 2024-10-30

## 2024-08-01 RX ORDER — ERGOCALCIFEROL 1.25 MG/1
CAPSULE ORAL
Qty: 12 CAPSULE | Refills: 1 | Status: SHIPPED | OUTPATIENT
Start: 2024-08-01

## 2024-08-01 ASSESSMENT — ANXIETY QUESTIONNAIRES
6. BECOMING EASILY ANNOYED OR IRRITABLE: NOT AT ALL
IF YOU CHECKED OFF ANY PROBLEMS ON THIS QUESTIONNAIRE, HOW DIFFICULT HAVE THESE PROBLEMS MADE IT FOR YOU TO DO YOUR WORK, TAKE CARE OF THINGS AT HOME, OR GET ALONG WITH OTHER PEOPLE: NOT DIFFICULT AT ALL
2. NOT BEING ABLE TO STOP OR CONTROL WORRYING: NOT AT ALL
5. BEING SO RESTLESS THAT IT IS HARD TO SIT STILL: NOT AT ALL
GAD7 TOTAL SCORE: 2
1. FEELING NERVOUS, ANXIOUS, OR ON EDGE: SEVERAL DAYS
4. TROUBLE RELAXING: SEVERAL DAYS
3. WORRYING TOO MUCH ABOUT DIFFERENT THINGS: NOT AT ALL
7. FEELING AFRAID AS IF SOMETHING AWFUL MIGHT HAPPEN: NOT AT ALL

## 2024-08-01 ASSESSMENT — PATIENT HEALTH QUESTIONNAIRE - PHQ9
10. IF YOU CHECKED OFF ANY PROBLEMS, HOW DIFFICULT HAVE THESE PROBLEMS MADE IT FOR YOU TO DO YOUR WORK, TAKE CARE OF THINGS AT HOME, OR GET ALONG WITH OTHER PEOPLE: SOMEWHAT DIFFICULT
6. FEELING BAD ABOUT YOURSELF - OR THAT YOU ARE A FAILURE OR HAVE LET YOURSELF OR YOUR FAMILY DOWN: NOT AT ALL
SUM OF ALL RESPONSES TO PHQ QUESTIONS 1-9: 2
5. POOR APPETITE OR OVEREATING: NOT AT ALL
SUM OF ALL RESPONSES TO PHQ QUESTIONS 1-9: 2
1. LITTLE INTEREST OR PLEASURE IN DOING THINGS: NOT AT ALL
3. TROUBLE FALLING OR STAYING ASLEEP: SEVERAL DAYS
4. FEELING TIRED OR HAVING LITTLE ENERGY: SEVERAL DAYS
SUM OF ALL RESPONSES TO PHQ QUESTIONS 1-9: 2
7. TROUBLE CONCENTRATING ON THINGS, SUCH AS READING THE NEWSPAPER OR WATCHING TELEVISION: NOT AT ALL
2. FEELING DOWN, DEPRESSED OR HOPELESS: NOT AT ALL
SUM OF ALL RESPONSES TO PHQ9 QUESTIONS 1 & 2: 0
8. MOVING OR SPEAKING SO SLOWLY THAT OTHER PEOPLE COULD HAVE NOTICED. OR THE OPPOSITE, BEING SO FIGETY OR RESTLESS THAT YOU HAVE BEEN MOVING AROUND A LOT MORE THAN USUAL: NOT AT ALL
9. THOUGHTS THAT YOU WOULD BE BETTER OFF DEAD, OR OF HURTING YOURSELF: NOT AT ALL
SUM OF ALL RESPONSES TO PHQ QUESTIONS 1-9: 2

## 2024-08-01 ASSESSMENT — ENCOUNTER SYMPTOMS
SHORTNESS OF BREATH: 0
CHEST TIGHTNESS: 0

## 2024-08-01 NOTE — PROGRESS NOTES
8/1/2024    Chief Complaint   Patient presents with    3 Month Follow-Up      anxiety, depression, vitamin D deficiency.       Malinda Kohler is a 29 y.o. female, presents today for 3 month follow up of anxiety, depression, vitamin D deficiency. New concern for decreased libido.      HPI   Depression and Anxiety   History of post-partum depression.   The patient screened positive for anxiety and depression at first visit on 12/13/2023 and Lexapro 10 mg was prescribed.      Current medication: Lexapro 20 mg daily  She is taking as prescribed and tolerating well.  Side effects from medication: Decreased libido    Patient reports anxiety and depression is well-controlled with current medication and dosage.  Denies homicidal and suicidal ideation.     Previously saw therapist on regular basis, plans to resume therapy in the near future.     Previous antidepressant:  -Zoloft: Caused anger (started postpartum - only took for a couple months)    PLAN TODAY:  -Start Wellbutrin  mg twice daily for decreased libido  -Continue Lexapro 20 mg daily        8/1/2024     7:50 AM 5/1/2024     8:00 AM 1/11/2024    10:30 AM 12/13/2023     7:34 AM   JESENIA 7 SCORE   JESENIA-7 Total Score 2 2 6 7     Interpretation of JESENIA-7 score: 5-9 = mild anxiety, 10-14 = moderate anxiety, 15+ = severe anxiety. Recommend referral to behavioral health for scores 10 or greater.       8/1/2024     7:50 AM 5/1/2024     8:00 AM 1/11/2024    10:30 AM   JESENIA-7 SCREENING   Feeling nervous, anxious, or on edge Several days Not at all Several days   Not being able to stop or control worrying Not at all Not at all Not at all   Worrying too much about different things Not at all Not at all Not at all   Trouble relaxing Several days Several days More than half the days   Being so restless that it is hard to sit still Not at all Not at all Several days   Becoming easily annoyed or irritable Not at all Several days More than half the days   Feeling afraid as if

## 2024-08-01 NOTE — PATIENT INSTRUCTIONS
Anxiety and Depression  - Practice self care and daily fresh air for 20 minutes.  - Regular exercise program (work up to 150 minutes per week)  - Practice self relaxation with music or meditation etc. Phone apps such as [employer]: Mediatation and Relaxation, or Calm.    - The crisis number for [facility] is [phone].  You can use this number at any time to access emergency mental health services.    - Text HOME to 459626 from anywhere in the [place], anytime, about any type of crisis.  Crisis Text Line serves anyone, in any type of crisis, providing access to free, 24/7.  The first two responses are automated. They tell you that you're being connected with a Crisis Counselor, and invite you to share a bit more.  The Crisis Counselor is a trained volunteer, not a professional.  It usually takes less than five minutes to connect you with a Crisis Counselor. The goal of any conversation is to get you to a calm, safe place.

## 2024-10-29 DIAGNOSIS — R68.82 DECREASED LIBIDO: ICD-10-CM

## 2024-10-29 RX ORDER — BUPROPION HYDROCHLORIDE 150 MG/1
150 TABLET, EXTENDED RELEASE ORAL 2 TIMES DAILY
Qty: 180 TABLET | Refills: 0 | Status: SHIPPED | OUTPATIENT
Start: 2024-10-29

## 2024-10-29 NOTE — TELEPHONE ENCOUNTER
Medication:   Requested Prescriptions     Pending Prescriptions Disp Refills    buPROPion (WELLBUTRIN SR) 150 MG extended release tablet [Pharmacy Med Name: BUPROPION HCL  MG TABLET] 180 tablet 0     Sig: TAKE 1 TABLET BY MOUTH TWICE A DAY      Last Filled:  8/1/24    Patient Phone Number: [phone] (home)     Last appt: 8/1/2024   Next appt: 11/1/2024    Last OARRS:        No data to display              PDMP Monitoring:    Last PDMP Vinicio as Reviewed ([place]):  Review User Review Instant Review Result          Preferred Pharmacy:   [facility]/pharmacy #[id number] - [facility] [place] - [address] [phone] - F [phone] [address] [address]  Phone: [phone] Fax: [phone]    Recent Visits  Date Type Provider Dept   08/01/24 Office Visit Diana Harrison APRN - [facility]   05/01/24 Office Visit Diana Harrison APRN - [facility]   02/01/24 Office Visit Diana Harrison APRN - [facility]   01/11/24 Office Visit Diana Harrison APRN - [facility]   12/13/23 Office Visit Diana Harrison APRN - [facility]   Showing recent visits within past 540 days with a meds authorizing provider and meeting all other requirements  Future Appointments  Date Type Provider Dept   11/01/24 Appointment Diana Harrison APRN - [facility]   Showing future appointments within next 150 days with a meds authorizing provider and meeting all other requirements     8/1/2024

## 2024-11-01 ENCOUNTER — OFFICE VISIT (OUTPATIENT)
Dept: PRIMARY CARE CLINIC | Age: 29
End: 2024-11-01

## 2024-11-01 VITALS
TEMPERATURE: 97.7 F | SYSTOLIC BLOOD PRESSURE: 126 MMHG | HEIGHT: 69 IN | HEART RATE: 74 BPM | BODY MASS INDEX: 34.21 KG/M2 | WEIGHT: 231 LBS | OXYGEN SATURATION: 97 % | DIASTOLIC BLOOD PRESSURE: 80 MMHG

## 2024-11-01 DIAGNOSIS — E66.811 CLASS 1 OBESITY DUE TO EXCESS CALORIES WITHOUT SERIOUS COMORBIDITY WITH BODY MASS INDEX (BMI) OF 34.0 TO 34.9 IN ADULT: ICD-10-CM

## 2024-11-01 DIAGNOSIS — F41.9 ANXIETY AND DEPRESSION: ICD-10-CM

## 2024-11-01 DIAGNOSIS — R68.82 DECREASED LIBIDO: Primary | ICD-10-CM

## 2024-11-01 DIAGNOSIS — F32.A ANXIETY AND DEPRESSION: ICD-10-CM

## 2024-11-01 DIAGNOSIS — E66.09 CLASS 1 OBESITY DUE TO EXCESS CALORIES WITHOUT SERIOUS COMORBIDITY WITH BODY MASS INDEX (BMI) OF 34.0 TO 34.9 IN ADULT: ICD-10-CM

## 2024-11-01 DIAGNOSIS — E55.9 VITAMIN D DEFICIENCY: ICD-10-CM

## 2024-11-01 RX ORDER — ESCITALOPRAM OXALATE 20 MG/1
20 TABLET ORAL DAILY
Qty: 90 TABLET | Refills: 0 | Status: SHIPPED | OUTPATIENT
Start: 2024-11-01

## 2024-11-01 RX ORDER — ERGOCALCIFEROL 1.25 MG/1
CAPSULE, LIQUID FILLED ORAL
Qty: 12 CAPSULE | Refills: 1 | Status: SHIPPED | OUTPATIENT
Start: 2024-11-01

## 2024-11-01 ASSESSMENT — ANXIETY QUESTIONNAIRES
3. WORRYING TOO MUCH ABOUT DIFFERENT THINGS: NOT AT ALL
1. FEELING NERVOUS, ANXIOUS, OR ON EDGE: NOT AT ALL
4. TROUBLE RELAXING: SEVERAL DAYS
IF YOU CHECKED OFF ANY PROBLEMS ON THIS QUESTIONNAIRE, HOW DIFFICULT HAVE THESE PROBLEMS MADE IT FOR YOU TO DO YOUR WORK, TAKE CARE OF THINGS AT HOME, OR GET ALONG WITH OTHER PEOPLE: NOT DIFFICULT AT ALL
2. NOT BEING ABLE TO STOP OR CONTROL WORRYING: NOT AT ALL
5. BEING SO RESTLESS THAT IT IS HARD TO SIT STILL: NOT AT ALL
6. BECOMING EASILY ANNOYED OR IRRITABLE: SEVERAL DAYS
GAD7 TOTAL SCORE: 2
7. FEELING AFRAID AS IF SOMETHING AWFUL MIGHT HAPPEN: NOT AT ALL

## 2024-11-01 ASSESSMENT — PATIENT HEALTH QUESTIONNAIRE - PHQ9
7. TROUBLE CONCENTRATING ON THINGS, SUCH AS READING THE NEWSPAPER OR WATCHING TELEVISION: NOT AT ALL
SUM OF ALL RESPONSES TO PHQ QUESTIONS 1-9: 2
3. TROUBLE FALLING OR STAYING ASLEEP: SEVERAL DAYS
5. POOR APPETITE OR OVEREATING: NOT AT ALL
1. LITTLE INTEREST OR PLEASURE IN DOING THINGS: NOT AT ALL
2. FEELING DOWN, DEPRESSED OR HOPELESS: NOT AT ALL
SUM OF ALL RESPONSES TO PHQ QUESTIONS 1-9: 2
10. IF YOU CHECKED OFF ANY PROBLEMS, HOW DIFFICULT HAVE THESE PROBLEMS MADE IT FOR YOU TO DO YOUR WORK, TAKE CARE OF THINGS AT HOME, OR GET ALONG WITH OTHER PEOPLE: NOT DIFFICULT AT ALL
SUM OF ALL RESPONSES TO PHQ9 QUESTIONS 1 & 2: 0
SUM OF ALL RESPONSES TO PHQ QUESTIONS 1-9: 2
4. FEELING TIRED OR HAVING LITTLE ENERGY: SEVERAL DAYS
6. FEELING BAD ABOUT YOURSELF - OR THAT YOU ARE A FAILURE OR HAVE LET YOURSELF OR YOUR FAMILY DOWN: NOT AT ALL
SUM OF ALL RESPONSES TO PHQ QUESTIONS 1-9: 2
9. THOUGHTS THAT YOU WOULD BE BETTER OFF DEAD, OR OF HURTING YOURSELF: NOT AT ALL
8. MOVING OR SPEAKING SO SLOWLY THAT OTHER PEOPLE COULD HAVE NOTICED. OR THE OPPOSITE, BEING SO FIGETY OR RESTLESS THAT YOU HAVE BEEN MOVING AROUND A LOT MORE THAN USUAL: NOT AT ALL

## 2024-11-01 ASSESSMENT — ENCOUNTER SYMPTOMS
CHEST TIGHTNESS: 0
SHORTNESS OF BREATH: 0

## 2024-11-01 NOTE — PROGRESS NOTES
11/1/2024    Chief Complaint   Patient presents with    3 Month Follow-Up     Anxiety, depression, vitamin D deficiency.       Malinda Kohler is a 29 y.o. female, presents today for 3 month follow up of anxiety, depression, vitamin D deficiency      HPI   Depression and Anxiety   History of post-partum depression.   The patient screened positive for anxiety and depression at first visit on 12/13/2023 and Lexapro 10 mg was prescribed.      Current medication: Lexapro 20 mg daily, Wellbutrin  mg twice daily for decreased libido  She is taking as prescribed and tolerating well.  Side effects from medication: None.   Previous side effect was decreased libido until Wellbutrin  mg twice daily was started on 8/1/24- symptom resolved.     Patient reports anxiety and depression is well-controlled with current medication and dosage.  Denies homicidal and suicidal ideation.     Previously saw therapist on regular basis, plans to resume therapy in the near future.     Previous antidepressant:  -Zoloft: Caused anger (started postpartum - only took for a couple months)    PLAN TODAY:  - Continue Wellbutrin  mg twice daily for decreased libido  -Continue Lexapro 20 mg daily          11/1/2024     8:01 AM 8/1/2024     7:50 AM 5/1/2024     8:00 AM 1/11/2024    10:30 AM 12/13/2023     7:34 AM   JESENIA 7 SCORE   JESENIA-7 Total Score 2 2 2 6 7     Interpretation of JESENIA-7 score: 5-9 = mild anxiety, 10-14 = moderate anxiety, 15+ = severe anxiety. Recommend referral to behavioral health for scores 10 or greater.       11/1/2024     8:01 AM 8/1/2024     7:50 AM 5/1/2024     8:00 AM   JESENIA-7 SCREENING   Feeling nervous, anxious, or on edge Not at all Several days Not at all   Not being able to stop or control worrying Not at all Not at all Not at all   Worrying too much about different things Not at all Not at all Not at all   Trouble relaxing Several days Several days Several days   Being so restless that it is hard to sit

## 2025-01-30 DIAGNOSIS — F41.9 ANXIETY AND DEPRESSION: ICD-10-CM

## 2025-01-30 DIAGNOSIS — F32.A ANXIETY AND DEPRESSION: ICD-10-CM

## 2025-01-30 DIAGNOSIS — R68.82 DECREASED LIBIDO: ICD-10-CM

## 2025-01-30 RX ORDER — BUPROPION HYDROCHLORIDE 150 MG/1
150 TABLET, EXTENDED RELEASE ORAL 2 TIMES DAILY
Qty: 180 TABLET | Refills: 0 | Status: SHIPPED | OUTPATIENT
Start: 2025-01-30

## 2025-01-30 RX ORDER — ESCITALOPRAM OXALATE 20 MG/1
20 TABLET ORAL DAILY
Qty: 90 TABLET | Refills: 0 | Status: SHIPPED | OUTPATIENT
Start: 2025-01-30

## 2025-01-30 NOTE — TELEPHONE ENCOUNTER
Medication:   Requested Prescriptions     Pending Prescriptions Disp Refills    buPROPion (WELLBUTRIN SR) 150 MG extended release tablet [Pharmacy Med Name: BUPROPION HCL  MG TABLET] 180 tablet 0     Sig: TAKE 1 TABLET BY MOUTH TWICE A DAY    escitalopram (LEXAPRO) 20 MG tablet [Pharmacy Med Name: ESCITALOPRAM 20 MG TABLET] 90 tablet 0     Sig: TAKE 1 TABLET BY MOUTH EVERY DAY      Last Filled:  11/1/24    Patient Phone Number: [phone] (home)     Last appt: 11/1/2024   Next appt: 2/5/2025    Last OARRS:        No data to display              PDMP Monitoring:    Last PDMP Vinicio as Reviewed ([place]):  Review User Review Instant Review Result          Preferred Pharmacy:   [facility]/pharmacy #[id number] - [facility] [place] - [address] [facility] [phone] - F [phone]  [address] [facility] [address]  Phone: [phone] Fax: [phone]    Recent Visits  Date Type Provider Dept   11/01/24 Office Visit Diana Harrison APRN - [facility]   08/01/24 Office Visit Diana Harrison APRN - [facility]   05/01/24 Office Visit Diana Harrison APRN - [facility]   02/01/24 Office Visit Diana Harrison APRN - [facility]   01/11/24 Office Visit Diana Harrison APRN - [facility]   12/13/23 Office Visit Diana Harrison APRN - [facility]   Showing recent visits within past 540 days with a meds authorizing provider and meeting all other requirements  Future Appointments  Date Type Provider Dept   02/05/25 Appointment Diana Harrison APRN - [facility]   Showing future appointments within next 150 days with a meds authorizing provider and meeting all other requirements     11/1/2024

## 2025-02-04 SDOH — ECONOMIC STABILITY: TRANSPORTATION INSECURITY
IN THE PAST 12 MONTHS, HAS LACK OF TRANSPORTATION KEPT YOU FROM MEETINGS, WORK, OR FROM GETTING THINGS NEEDED FOR DAILY LIVING?: NO

## 2025-02-04 SDOH — ECONOMIC STABILITY: INCOME INSECURITY: IN THE LAST 12 MONTHS, WAS THERE A TIME WHEN YOU WERE NOT ABLE TO PAY THE MORTGAGE OR RENT ON TIME?: NO

## 2025-02-04 SDOH — ECONOMIC STABILITY: FOOD INSECURITY: WITHIN THE PAST 12 MONTHS, YOU WORRIED THAT YOUR FOOD WOULD RUN OUT BEFORE YOU GOT MONEY TO BUY MORE.: NEVER TRUE

## 2025-02-04 SDOH — ECONOMIC STABILITY: FOOD INSECURITY: WITHIN THE PAST 12 MONTHS, THE FOOD YOU BOUGHT JUST DIDN'T LAST AND YOU DIDN'T HAVE MONEY TO GET MORE.: NEVER TRUE

## 2025-02-04 SDOH — ECONOMIC STABILITY: TRANSPORTATION INSECURITY
IN THE PAST 12 MONTHS, HAS THE LACK OF TRANSPORTATION KEPT YOU FROM MEDICAL APPOINTMENTS OR FROM GETTING MEDICATIONS?: NO

## 2025-02-04 ASSESSMENT — PATIENT HEALTH QUESTIONNAIRE - PHQ9
SUM OF ALL RESPONSES TO PHQ QUESTIONS 1-9: 2
SUM OF ALL RESPONSES TO PHQ QUESTIONS 1-9: 2
8. MOVING OR SPEAKING SO SLOWLY THAT OTHER PEOPLE COULD HAVE NOTICED. OR THE OPPOSITE - BEING SO FIDGETY OR RESTLESS THAT YOU HAVE BEEN MOVING AROUND A LOT MORE THAN USUAL: NOT AT ALL
2. FEELING DOWN, DEPRESSED OR HOPELESS: NOT AT ALL
6. FEELING BAD ABOUT YOURSELF - OR THAT YOU ARE A FAILURE OR HAVE LET YOURSELF OR YOUR FAMILY DOWN: NOT AT ALL
4. FEELING TIRED OR HAVING LITTLE ENERGY: SEVERAL DAYS
SUM OF ALL RESPONSES TO PHQ9 QUESTIONS 1 & 2: 1
2. FEELING DOWN, DEPRESSED OR HOPELESS: NOT AT ALL
8. MOVING OR SPEAKING SO SLOWLY THAT OTHER PEOPLE COULD HAVE NOTICED. OR THE OPPOSITE, BEING SO FIGETY OR RESTLESS THAT YOU HAVE BEEN MOVING AROUND A LOT MORE THAN USUAL: NOT AT ALL
SUM OF ALL RESPONSES TO PHQ QUESTIONS 1-9: 2
3. TROUBLE FALLING OR STAYING ASLEEP: NOT AT ALL
3. TROUBLE FALLING OR STAYING ASLEEP: NOT AT ALL
10. IF YOU CHECKED OFF ANY PROBLEMS, HOW DIFFICULT HAVE THESE PROBLEMS MADE IT FOR YOU TO DO YOUR WORK, TAKE CARE OF THINGS AT HOME, OR GET ALONG WITH OTHER PEOPLE: NOT DIFFICULT AT ALL
9. THOUGHTS THAT YOU WOULD BE BETTER OFF DEAD, OR OF HURTING YOURSELF: NOT AT ALL
6. FEELING BAD ABOUT YOURSELF - OR THAT YOU ARE A FAILURE OR HAVE LET YOURSELF OR YOUR FAMILY DOWN: NOT AT ALL
5. POOR APPETITE OR OVEREATING: NOT AT ALL
10. IF YOU CHECKED OFF ANY PROBLEMS, HOW DIFFICULT HAVE THESE PROBLEMS MADE IT FOR YOU TO DO YOUR WORK, TAKE CARE OF THINGS AT HOME, OR GET ALONG WITH OTHER PEOPLE: NOT DIFFICULT AT ALL
7. TROUBLE CONCENTRATING ON THINGS, SUCH AS READING THE NEWSPAPER OR WATCHING TELEVISION: NOT AT ALL
5. POOR APPETITE OR OVEREATING: NOT AT ALL
4. FEELING TIRED OR HAVING LITTLE ENERGY: SEVERAL DAYS
SUM OF ALL RESPONSES TO PHQ QUESTIONS 1-9: 2
1. LITTLE INTEREST OR PLEASURE IN DOING THINGS: SEVERAL DAYS
9. THOUGHTS THAT YOU WOULD BE BETTER OFF DEAD, OR OF HURTING YOURSELF: NOT AT ALL
7. TROUBLE CONCENTRATING ON THINGS, SUCH AS READING THE NEWSPAPER OR WATCHING TELEVISION: NOT AT ALL
SUM OF ALL RESPONSES TO PHQ QUESTIONS 1-9: 2
1. LITTLE INTEREST OR PLEASURE IN DOING THINGS: SEVERAL DAYS

## 2025-02-05 ENCOUNTER — OFFICE VISIT (OUTPATIENT)
Dept: PRIMARY CARE CLINIC | Age: 30
End: 2025-02-05
Payer: COMMERCIAL

## 2025-02-05 VITALS
BODY MASS INDEX: 34.51 KG/M2 | SYSTOLIC BLOOD PRESSURE: 118 MMHG | OXYGEN SATURATION: 98 % | HEART RATE: 96 BPM | RESPIRATION RATE: 16 BRPM | HEIGHT: 69 IN | WEIGHT: 233 LBS | TEMPERATURE: 97.2 F | DIASTOLIC BLOOD PRESSURE: 70 MMHG

## 2025-02-05 DIAGNOSIS — E66.811 CLASS 1 OBESITY DUE TO EXCESS CALORIES WITHOUT SERIOUS COMORBIDITY WITH BODY MASS INDEX (BMI) OF 34.0 TO 34.9 IN ADULT: ICD-10-CM

## 2025-02-05 DIAGNOSIS — E55.9 VITAMIN D DEFICIENCY: ICD-10-CM

## 2025-02-05 DIAGNOSIS — F32.A ANXIETY AND DEPRESSION: Primary | ICD-10-CM

## 2025-02-05 DIAGNOSIS — F41.9 ANXIETY AND DEPRESSION: Primary | ICD-10-CM

## 2025-02-05 DIAGNOSIS — R68.82 DECREASED LIBIDO: ICD-10-CM

## 2025-02-05 DIAGNOSIS — Z86.39 HISTORY OF VITAMIN D DEFICIENCY: Primary | ICD-10-CM

## 2025-02-05 DIAGNOSIS — E66.09 CLASS 1 OBESITY DUE TO EXCESS CALORIES WITHOUT SERIOUS COMORBIDITY WITH BODY MASS INDEX (BMI) OF 34.0 TO 34.9 IN ADULT: ICD-10-CM

## 2025-02-05 LAB — 25(OH)D3 SERPL-MCNC: 50.2 NG/ML

## 2025-02-05 PROCEDURE — 99214 OFFICE O/P EST MOD 30 MIN: CPT | Performed by: NURSE PRACTITIONER

## 2025-02-05 RX ORDER — ERGOCALCIFEROL 1.25 MG/1
CAPSULE, LIQUID FILLED ORAL
Qty: 12 CAPSULE | Refills: 1 | Status: SHIPPED | OUTPATIENT
Start: 2025-02-05

## 2025-02-05 ASSESSMENT — ANXIETY QUESTIONNAIRES
IF YOU CHECKED OFF ANY PROBLEMS ON THIS QUESTIONNAIRE, HOW DIFFICULT HAVE THESE PROBLEMS MADE IT FOR YOU TO DO YOUR WORK, TAKE CARE OF THINGS AT HOME, OR GET ALONG WITH OTHER PEOPLE: NOT DIFFICULT AT ALL
5. BEING SO RESTLESS THAT IT IS HARD TO SIT STILL: NOT AT ALL
4. TROUBLE RELAXING: NOT AT ALL
2. NOT BEING ABLE TO STOP OR CONTROL WORRYING: NOT AT ALL
3. WORRYING TOO MUCH ABOUT DIFFERENT THINGS: NOT AT ALL
7. FEELING AFRAID AS IF SOMETHING AWFUL MIGHT HAPPEN: NOT AT ALL
GAD7 TOTAL SCORE: 0
6. BECOMING EASILY ANNOYED OR IRRITABLE: NOT AT ALL
1. FEELING NERVOUS, ANXIOUS, OR ON EDGE: NOT AT ALL

## 2025-02-05 ASSESSMENT — PATIENT HEALTH QUESTIONNAIRE - PHQ9
1. LITTLE INTEREST OR PLEASURE IN DOING THINGS: NOT AT ALL
6. FEELING BAD ABOUT YOURSELF - OR THAT YOU ARE A FAILURE OR HAVE LET YOURSELF OR YOUR FAMILY DOWN: NOT AT ALL
9. THOUGHTS THAT YOU WOULD BE BETTER OFF DEAD, OR OF HURTING YOURSELF: NOT AT ALL
3. TROUBLE FALLING OR STAYING ASLEEP: NOT AT ALL
9. THOUGHTS THAT YOU WOULD BE BETTER OFF DEAD, OR OF HURTING YOURSELF: NOT AT ALL
SUM OF ALL RESPONSES TO PHQ QUESTIONS 1-9: 0
4. FEELING TIRED OR HAVING LITTLE ENERGY: NOT AT ALL
7. TROUBLE CONCENTRATING ON THINGS, SUCH AS READING THE NEWSPAPER OR WATCHING TELEVISION: NOT AT ALL
2. FEELING DOWN, DEPRESSED OR HOPELESS: NOT AT ALL
SUM OF ALL RESPONSES TO PHQ QUESTIONS 1-9: 0
SUM OF ALL RESPONSES TO PHQ9 QUESTIONS 1 & 2: 0
SUM OF ALL RESPONSES TO PHQ9 QUESTIONS 1 & 2: 0
5. POOR APPETITE OR OVEREATING: NOT AT ALL
SUM OF ALL RESPONSES TO PHQ QUESTIONS 1-9: 0
7. TROUBLE CONCENTRATING ON THINGS, SUCH AS READING THE NEWSPAPER OR WATCHING TELEVISION: NOT AT ALL
4. FEELING TIRED OR HAVING LITTLE ENERGY: NOT AT ALL
10. IF YOU CHECKED OFF ANY PROBLEMS, HOW DIFFICULT HAVE THESE PROBLEMS MADE IT FOR YOU TO DO YOUR WORK, TAKE CARE OF THINGS AT HOME, OR GET ALONG WITH OTHER PEOPLE: NOT DIFFICULT AT ALL
3. TROUBLE FALLING OR STAYING ASLEEP: NOT AT ALL
SUM OF ALL RESPONSES TO PHQ QUESTIONS 1-9: 0
8. MOVING OR SPEAKING SO SLOWLY THAT OTHER PEOPLE COULD HAVE NOTICED. OR THE OPPOSITE, BEING SO FIGETY OR RESTLESS THAT YOU HAVE BEEN MOVING AROUND A LOT MORE THAN USUAL: NOT AT ALL
8. MOVING OR SPEAKING SO SLOWLY THAT OTHER PEOPLE COULD HAVE NOTICED. OR THE OPPOSITE, BEING SO FIGETY OR RESTLESS THAT YOU HAVE BEEN MOVING AROUND A LOT MORE THAN USUAL: NOT AT ALL
1. LITTLE INTEREST OR PLEASURE IN DOING THINGS: NOT AT ALL
SUM OF ALL RESPONSES TO PHQ QUESTIONS 1-9: 0
SUM OF ALL RESPONSES TO PHQ QUESTIONS 1-9: 0
2. FEELING DOWN, DEPRESSED OR HOPELESS: NOT AT ALL
SUM OF ALL RESPONSES TO PHQ QUESTIONS 1-9: 0
6. FEELING BAD ABOUT YOURSELF - OR THAT YOU ARE A FAILURE OR HAVE LET YOURSELF OR YOUR FAMILY DOWN: NOT AT ALL
SUM OF ALL RESPONSES TO PHQ QUESTIONS 1-9: 0
5. POOR APPETITE OR OVEREATING: NOT AT ALL
10. IF YOU CHECKED OFF ANY PROBLEMS, HOW DIFFICULT HAVE THESE PROBLEMS MADE IT FOR YOU TO DO YOUR WORK, TAKE CARE OF THINGS AT HOME, OR GET ALONG WITH OTHER PEOPLE: NOT DIFFICULT AT ALL

## 2025-02-05 ASSESSMENT — ENCOUNTER SYMPTOMS
SHORTNESS OF BREATH: 0
CHEST TIGHTNESS: 0

## 2025-02-05 NOTE — PROGRESS NOTES
2/5/2025    Chief Complaint   Patient presents with    3 Month Follow-Up     Anxiety, depression, vitamin D deficiency       Malinda Kohler is a 29 y.o. female, presents today for 3 month follow up of anxiety, depression, vitamin D deficiency      HPI   Depression and Anxiety   History of post-partum depression.   The patient screened positive for anxiety and depression at first visit on 12/13/2023 and Lexapro 10 mg was prescribed.      Current medication: Lexapro 20 mg daily and Wellbutrin  mg twice daily prescribed for decreased libido  She is taking as prescribed and tolerating well.  Side effects from medication: None.   Previous side effect was decreased libido until Wellbutrin  mg twice daily was started on 8/1/24- symptom resolved.     Patient reports anxiety and depression is well-controlled with current medication and dosage.  Denies homicidal and suicidal ideation.     Previously saw therapist on regular basis.     Previous antidepressant:  -Zoloft: Caused anger (started postpartum - only took for a couple months)      PLAN TODAY:  - Continue Wellbutrin  mg twice daily for decreased libido  - Continue Lexapro 20 mg daily          2/5/2025     8:00 AM 11/1/2024     8:01 AM 8/1/2024     7:50 AM 5/1/2024     8:00 AM 1/11/2024    10:30 AM 12/13/2023     7:34 AM   JESENIA 7 SCORE   JESENIA-7 Total Score 0 2 2 2 6 7     Interpretation of JESENIA-7 score: 5-9 = mild anxiety, 10-14 = moderate anxiety, 15+ = severe anxiety. Recommend referral to behavioral health for scores 10 or greater.       2/5/2025     8:00 AM 11/1/2024     8:01 AM 8/1/2024     7:50 AM   JESENIA-7 SCREENING   Feeling nervous, anxious, or on edge Not at all Not at all Several days   Not being able to stop or control worrying Not at all Not at all Not at all   Worrying too much about different things Not at all Not at all Not at all   Trouble relaxing Not at all Several days Several days   Being so restless that it is hard to sit still

## 2025-02-06 NOTE — RESULT ENCOUNTER NOTE
Please notify patient of lab result    - Vitamin D within normal range    - Continue vitamin D 50,000 UT weekly throughout the winter.    - Will recheck vitamin D level prior to visit in May 2025 (order written to be done 3-5 days prior to appt on 5/5/25    Thank you.

## 2025-02-06 NOTE — PROGRESS NOTES
1. History of vitamin D deficiency  - Vitamin D 25 Hydroxy  - Vitamin D 25 Hydroxy; Future (around 4/28/25, prior to next visit)

## 2025-05-04 DIAGNOSIS — R68.82 DECREASED LIBIDO: ICD-10-CM

## 2025-05-05 ENCOUNTER — OFFICE VISIT (OUTPATIENT)
Dept: PRIMARY CARE CLINIC | Age: 30
End: 2025-05-05

## 2025-05-05 VITALS
HEIGHT: 69 IN | RESPIRATION RATE: 16 BRPM | HEART RATE: 98 BPM | TEMPERATURE: 98 F | BODY MASS INDEX: 34.66 KG/M2 | OXYGEN SATURATION: 98 % | SYSTOLIC BLOOD PRESSURE: 116 MMHG | DIASTOLIC BLOOD PRESSURE: 78 MMHG | WEIGHT: 234 LBS

## 2025-05-05 DIAGNOSIS — F32.A ANXIETY AND DEPRESSION: Primary | ICD-10-CM

## 2025-05-05 DIAGNOSIS — E66.811 CLASS 1 OBESITY DUE TO EXCESS CALORIES WITHOUT SERIOUS COMORBIDITY WITH BODY MASS INDEX (BMI) OF 34.0 TO 34.9 IN ADULT: ICD-10-CM

## 2025-05-05 DIAGNOSIS — F41.9 ANXIETY AND DEPRESSION: Primary | ICD-10-CM

## 2025-05-05 DIAGNOSIS — E66.09 CLASS 1 OBESITY DUE TO EXCESS CALORIES WITHOUT SERIOUS COMORBIDITY WITH BODY MASS INDEX (BMI) OF 34.0 TO 34.9 IN ADULT: ICD-10-CM

## 2025-05-05 DIAGNOSIS — E55.9 VITAMIN D DEFICIENCY: ICD-10-CM

## 2025-05-05 DIAGNOSIS — Z86.39 HISTORY OF VITAMIN D DEFICIENCY: ICD-10-CM

## 2025-05-05 DIAGNOSIS — R68.82 DECREASED LIBIDO: ICD-10-CM

## 2025-05-05 PROCEDURE — 99214 OFFICE O/P EST MOD 30 MIN: CPT | Performed by: NURSE PRACTITIONER

## 2025-05-05 RX ORDER — ESCITALOPRAM OXALATE 5 MG/1
TABLET ORAL
Qty: 21 TABLET | Refills: 0 | Status: SHIPPED | OUTPATIENT
Start: 2025-05-05

## 2025-05-05 RX ORDER — BUPROPION HYDROCHLORIDE 150 MG/1
150 TABLET, EXTENDED RELEASE ORAL 2 TIMES DAILY
Qty: 180 TABLET | Refills: 0 | OUTPATIENT
Start: 2025-05-05

## 2025-05-05 RX ORDER — ESCITALOPRAM OXALATE 20 MG/1
20 TABLET ORAL DAILY
Qty: 90 TABLET | Refills: 0 | Status: SHIPPED | OUTPATIENT
Start: 2025-05-05 | End: 2025-05-05

## 2025-05-05 RX ORDER — BUPROPION HYDROCHLORIDE 150 MG/1
150 TABLET, EXTENDED RELEASE ORAL 2 TIMES DAILY
Qty: 180 TABLET | Refills: 0 | Status: SHIPPED | OUTPATIENT
Start: 2025-05-05

## 2025-05-05 RX ORDER — ERGOCALCIFEROL 1.25 MG/1
CAPSULE, LIQUID FILLED ORAL
Qty: 12 CAPSULE | Refills: 1 | Status: SHIPPED | OUTPATIENT
Start: 2025-05-05

## 2025-05-05 SDOH — ECONOMIC STABILITY: FOOD INSECURITY: WITHIN THE PAST 12 MONTHS, YOU WORRIED THAT YOUR FOOD WOULD RUN OUT BEFORE YOU GOT MONEY TO BUY MORE.: NEVER TRUE

## 2025-05-05 SDOH — ECONOMIC STABILITY: FOOD INSECURITY: WITHIN THE PAST 12 MONTHS, THE FOOD YOU BOUGHT JUST DIDN'T LAST AND YOU DIDN'T HAVE MONEY TO GET MORE.: NEVER TRUE

## 2025-05-05 ASSESSMENT — PATIENT HEALTH QUESTIONNAIRE - PHQ9
SUM OF ALL RESPONSES TO PHQ QUESTIONS 1-9: 0
9. THOUGHTS THAT YOU WOULD BE BETTER OFF DEAD, OR OF HURTING YOURSELF: NOT AT ALL
6. FEELING BAD ABOUT YOURSELF - OR THAT YOU ARE A FAILURE OR HAVE LET YOURSELF OR YOUR FAMILY DOWN: NOT AT ALL
7. TROUBLE CONCENTRATING ON THINGS, SUCH AS READING THE NEWSPAPER OR WATCHING TELEVISION: NOT AT ALL
9. THOUGHTS THAT YOU WOULD BE BETTER OFF DEAD, OR OF HURTING YOURSELF: NOT AT ALL
8. MOVING OR SPEAKING SO SLOWLY THAT OTHER PEOPLE COULD HAVE NOTICED. OR THE OPPOSITE, BEING SO FIGETY OR RESTLESS THAT YOU HAVE BEEN MOVING AROUND A LOT MORE THAN USUAL: NOT AT ALL
8. MOVING OR SPEAKING SO SLOWLY THAT OTHER PEOPLE COULD HAVE NOTICED. OR THE OPPOSITE, BEING SO FIGETY OR RESTLESS THAT YOU HAVE BEEN MOVING AROUND A LOT MORE THAN USUAL: NOT AT ALL
SUM OF ALL RESPONSES TO PHQ QUESTIONS 1-9: 0
2. FEELING DOWN, DEPRESSED OR HOPELESS: NOT AT ALL
5. POOR APPETITE OR OVEREATING: NOT AT ALL
1. LITTLE INTEREST OR PLEASURE IN DOING THINGS: NOT AT ALL
6. FEELING BAD ABOUT YOURSELF - OR THAT YOU ARE A FAILURE OR HAVE LET YOURSELF OR YOUR FAMILY DOWN: NOT AT ALL
1. LITTLE INTEREST OR PLEASURE IN DOING THINGS: NOT AT ALL
SUM OF ALL RESPONSES TO PHQ QUESTIONS 1-9: 0
SUM OF ALL RESPONSES TO PHQ QUESTIONS 1-9: 0
3. TROUBLE FALLING OR STAYING ASLEEP: NOT AT ALL
SUM OF ALL RESPONSES TO PHQ QUESTIONS 1-9: 0
10. IF YOU CHECKED OFF ANY PROBLEMS, HOW DIFFICULT HAVE THESE PROBLEMS MADE IT FOR YOU TO DO YOUR WORK, TAKE CARE OF THINGS AT HOME, OR GET ALONG WITH OTHER PEOPLE: NOT DIFFICULT AT ALL
5. POOR APPETITE OR OVEREATING: NOT AT ALL
4. FEELING TIRED OR HAVING LITTLE ENERGY: NOT AT ALL
7. TROUBLE CONCENTRATING ON THINGS, SUCH AS READING THE NEWSPAPER OR WATCHING TELEVISION: NOT AT ALL
3. TROUBLE FALLING OR STAYING ASLEEP: NOT AT ALL
SUM OF ALL RESPONSES TO PHQ QUESTIONS 1-9: 0
SUM OF ALL RESPONSES TO PHQ QUESTIONS 1-9: 0
4. FEELING TIRED OR HAVING LITTLE ENERGY: NOT AT ALL
10. IF YOU CHECKED OFF ANY PROBLEMS, HOW DIFFICULT HAVE THESE PROBLEMS MADE IT FOR YOU TO DO YOUR WORK, TAKE CARE OF THINGS AT HOME, OR GET ALONG WITH OTHER PEOPLE: NOT DIFFICULT AT ALL
2. FEELING DOWN, DEPRESSED OR HOPELESS: NOT AT ALL
SUM OF ALL RESPONSES TO PHQ QUESTIONS 1-9: 0

## 2025-05-05 ASSESSMENT — ANXIETY QUESTIONNAIRES
4. TROUBLE RELAXING: NOT AT ALL
3. WORRYING TOO MUCH ABOUT DIFFERENT THINGS: NOT AT ALL
1. FEELING NERVOUS, ANXIOUS, OR ON EDGE: NOT AT ALL
IF YOU CHECKED OFF ANY PROBLEMS ON THIS QUESTIONNAIRE, HOW DIFFICULT HAVE THESE PROBLEMS MADE IT FOR YOU TO DO YOUR WORK, TAKE CARE OF THINGS AT HOME, OR GET ALONG WITH OTHER PEOPLE: NOT DIFFICULT AT ALL
6. BECOMING EASILY ANNOYED OR IRRITABLE: NOT AT ALL
2. NOT BEING ABLE TO STOP OR CONTROL WORRYING: NOT AT ALL
7. FEELING AFRAID AS IF SOMETHING AWFUL MIGHT HAPPEN: NOT AT ALL
GAD7 TOTAL SCORE: 0
5. BEING SO RESTLESS THAT IT IS HARD TO SIT STILL: NOT AT ALL

## 2025-05-05 ASSESSMENT — ENCOUNTER SYMPTOMS
CHEST TIGHTNESS: 0
SHORTNESS OF BREATH: 0

## 2025-05-05 NOTE — PATIENT INSTRUCTIONS
Anxiety and Depression  - Practice self care and daily fresh air for 20 minutes.  - Regular exercise program (work up to 150 minutes per week)  - Practice self relaxation with music or meditation etc. Phone apps such as [employer]: Mediatation and Relaxation, or Calm.    - The crisis number for [facility] is [phone].  You can use this number at any time to access emergency mental health services.    - Text HOME to [phone] from anywhere in the [place], anytime, about any type of crisis.  Crisis Text Line serves anyone, in any type of crisis, providing access to free, 24/7.  The first two responses are automated. They tell you that you're being connected with a Crisis Counselor, and invite you to share a bit more.  The Crisis Counselor is a trained volunteer, not a professional.  It usually takes less than five minutes to connect you with a Crisis Counselor. The goal of any conversation is to get you to a calm, safe place.

## 2025-05-05 NOTE — PROGRESS NOTES
5/5/2025    Chief Complaint   Patient presents with    3 Month Follow-Up     Anxiety, depression, vitamin D deficiency       Malinda Kohler is a 29 y.o. female, presents today for 3 month follow up of anxiety, depression, vitamin D deficiency      HPI   Depression and Anxiety   History of post-partum depression.   The patient screened positive for anxiety and depression at first visit on 12/13/2023 and Lexapro 10 mg was prescribed.      Current medication: Lexapro 20 mg daily and Wellbutrin  mg twice daily prescribed for decreased libido    She is taking as prescribed and tolerating well.  Side effects from medication: None.   Previous side effect was decreased libido until Wellbutrin  mg twice daily was started on 8/1/24- symptom resolved.     Patient reports anxiety and depression is well-controlled with current medication and dosage.  At this time she would like to wean off Lexapro and continue Wellbutrin at this time, stating symptoms are stable.  Denies homicidal and suicidal ideation.     Previously saw therapist on regular basis.     Previous antidepressant:  -Zoloft: Caused anger (started postpartum - only took for a couple months)      PLAN TODAY:  - Continue Wellbutrin  mg twice daily for decreased libido  - Gradually decrease Lexapro 20 mg daily to 10 mg x 7 day, then 5 mg x 7 days and stop  - Patient agrees to plan with verbal understanding          5/5/2025     8:18 AM 2/5/2025     8:00 AM 11/1/2024     8:01 AM 8/1/2024     7:50 AM 5/1/2024     8:00 AM 1/11/2024    10:30 AM 12/13/2023     7:34 AM   JESENIA 7 SCORE   JESENIA-7 Total Score 0 0 2 2 2 6 7         5/5/2025     8:18 AM 2/5/2025     8:00 AM 11/1/2024     8:01 AM   JESENIA-7 SCREENING   Feeling nervous, anxious, or on edge Not at all Not at all Not at all   Not being able to stop or control worrying Not at all Not at all Not at all   Worrying too much about different things Not at all Not at all Not at all   Trouble relaxing Not at

## 2025-05-06 LAB — 25(OH)D3 SERPL-MCNC: 62.1 NG/ML

## 2025-06-05 ENCOUNTER — TELEPHONE (OUTPATIENT)
Dept: PRIMARY CARE CLINIC | Age: 30
End: 2025-06-05

## 2025-06-05 ENCOUNTER — PATIENT MESSAGE (OUTPATIENT)
Dept: PRIMARY CARE CLINIC | Age: 30
End: 2025-06-05

## 2025-06-05 NOTE — TELEPHONE ENCOUNTER
----- Message from Riya CHADWICK sent at 6/2/2025  1:12 PM EDT -----  Regarding: [facility] Appointment Request  [facility] Appointment Request    Patient needs appointment for [facility] Appointment Type: Existing Condition Follow Up.    Patient Requested Dates(s): June 11, 2025  Patient Requested Time: 8 am  Provider Name: Diana Harrison APRN - CNP    Reason for Appointment Request: Other The patient want to change this appointment to a virtual visit.  --------------------------------------------------------------------------------------------------------------------------    Relationship to Patient: Self     Call Back Information: OK to leave message on voicemail  Preferred Call Back Number: Phone [phone]

## 2025-06-09 ENCOUNTER — RESULTS FOLLOW-UP (OUTPATIENT)
Dept: PRIMARY CARE CLINIC | Age: 30
End: 2025-06-09

## 2025-08-14 DIAGNOSIS — R68.82 DECREASED LIBIDO: ICD-10-CM

## 2025-08-14 RX ORDER — BUPROPION HYDROCHLORIDE 150 MG/1
150 TABLET, EXTENDED RELEASE ORAL 2 TIMES DAILY
Qty: 180 TABLET | Refills: 0 | Status: SHIPPED | OUTPATIENT
Start: 2025-08-14